# Patient Record
Sex: MALE | Race: WHITE | NOT HISPANIC OR LATINO | Employment: FULL TIME | URBAN - METROPOLITAN AREA
[De-identification: names, ages, dates, MRNs, and addresses within clinical notes are randomized per-mention and may not be internally consistent; named-entity substitution may affect disease eponyms.]

---

## 2017-02-09 PROBLEM — I10 ESSENTIAL HYPERTENSION: Status: ACTIVE | Noted: 2017-02-09

## 2017-11-03 ENCOUNTER — OFFICE VISIT (OUTPATIENT)
Dept: CARDIOLOGY | Facility: CLINIC | Age: 32
End: 2017-11-03
Payer: COMMERCIAL

## 2017-11-03 VITALS
SYSTOLIC BLOOD PRESSURE: 132 MMHG | DIASTOLIC BLOOD PRESSURE: 100 MMHG | WEIGHT: 219.5 LBS | BODY MASS INDEX: 28.17 KG/M2 | HEIGHT: 74 IN

## 2017-11-03 DIAGNOSIS — I10 ESSENTIAL HYPERTENSION: Primary | ICD-10-CM

## 2017-11-03 PROCEDURE — 99213 OFFICE O/P EST LOW 20 MIN: CPT | Mod: S$GLB,,, | Performed by: INTERNAL MEDICINE

## 2017-11-03 RX ORDER — LOSARTAN POTASSIUM 25 MG/1
25 TABLET ORAL DAILY
Qty: 90 TABLET | Refills: 3 | Status: SHIPPED | OUTPATIENT
Start: 2017-11-03 | End: 2018-03-28 | Stop reason: DRUGHIGH

## 2017-11-03 RX ORDER — METHADONE HYDROCHLORIDE 40 MG/1
40 TABLET ORAL
COMMUNITY
End: 2021-08-17

## 2017-11-03 NOTE — PROGRESS NOTES
Subjective:   Patient ID:  Everett Bateman is a 32 y.o. male     Chief Complaint   Patient presents with    Follow-up     Hypertension       HPI: Pt feels pretty good.  Pt skateboards for 15 minutes or longer every day.    Review of Systems   Cardiovascular: Negative for chest pain, claudication, dyspnea on exertion, irregular heartbeat, leg swelling, near-syncope, orthopnea, palpitations and syncope.     Still works at [employer].  Objective:   Physical Exam   Constitutional: He is oriented to person, place, and time. He appears well-developed and well-nourished. No distress.   HENT:   Head: Normocephalic.   Eyes: No scleral icterus.   Neck: No JVD present.   Cardiovascular: Normal rate, regular rhythm and normal heart sounds.  Exam reveals no gallop and no friction rub.    No murmur heard.  Pulmonary/Chest: Effort normal and breath sounds normal. No stridor.   Musculoskeletal: He exhibits no edema.   Neurological: He is alert and oriented to person, place, and time.   Skin: Skin is warm and dry. He is not diaphoretic.   Psychiatric: He has a normal mood and affect. His behavior is normal. Judgment and thought content normal.   Vitals reviewed.      Assessment:     1. Essential hypertension        Plan:   Add losartan 25 mg daily  Keep BP log  RTC one month

## 2018-03-28 ENCOUNTER — OFFICE VISIT (OUTPATIENT)
Dept: CARDIOLOGY | Facility: CLINIC | Age: 33
End: 2018-03-28
Payer: COMMERCIAL

## 2018-03-28 VITALS
DIASTOLIC BLOOD PRESSURE: 96 MMHG | SYSTOLIC BLOOD PRESSURE: 148 MMHG | BODY MASS INDEX: 30.54 KG/M2 | HEIGHT: 74 IN | HEART RATE: 71 BPM | WEIGHT: 238 LBS

## 2018-03-28 DIAGNOSIS — I10 ESSENTIAL HYPERTENSION: Primary | ICD-10-CM

## 2018-03-28 PROCEDURE — 3077F SYST BP >= 140 MM HG: CPT | Mod: CPTII,S$GLB,, | Performed by: INTERNAL MEDICINE

## 2018-03-28 PROCEDURE — 99213 OFFICE O/P EST LOW 20 MIN: CPT | Mod: S$GLB,,, | Performed by: INTERNAL MEDICINE

## 2018-03-28 PROCEDURE — 3080F DIAST BP >= 90 MM HG: CPT | Mod: CPTII,S$GLB,, | Performed by: INTERNAL MEDICINE

## 2018-03-28 RX ORDER — LOSARTAN POTASSIUM 50 MG/1
50 TABLET ORAL DAILY
Qty: 90 TABLET | Refills: 3 | Status: SHIPPED | OUTPATIENT
Start: 2018-03-28 | End: 2018-05-25 | Stop reason: SDUPTHER

## 2018-03-28 NOTE — PROGRESS NOTES
Subjective:      Patient ID: Everett Bateman is a 32 y.o. male.    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  Avoids sugar. Avoids salt.      Review of Systems   Cardiovascular: Negative for chest pain, claudication, dyspnea on exertion, irregular heartbeat, leg swelling, near-syncope, orthopnea, palpitations and syncope.      Often naps after work.  Snores only when sleeping on his back  Past Medical History:   Diagnosis Date    Heart murmur     Hypertension         History reviewed. No pertinent surgical history.    Family History   Problem Relation Age of Onset    Hypertension Mother     Hypertension Father     Hypertension Sister     Colon cancer Maternal Grandfather     Diabetes Mellitus Maternal Grandfather        Social History     Social History    Marital status: Single     Spouse name: N/A    Number of children: N/A    Years of education: N/A     Social History Main Topics    Smoking status: Current Every Day Smoker     Packs/day: 0.50     Years: 5.00     Types: Cigarettes    Smokeless tobacco: Never Used    Alcohol use 1.2 oz/week     2 Cans of beer per week    Drug use: Unknown    Sexual activity: Not Asked     Other Topics Concern    None     Social History Narrative    None       Current Outpatient Prescriptions on File Prior to Visit   Medication Sig Dispense Refill    hydrochlorothiazide (MICROZIDE) 12.5 mg capsule Take 1 capsule (12.5 mg total) by mouth once daily. 90 capsule 3    methadone (METHADOSE) 40 mg disintegrating tablet Take 40 mg by mouth. Take 2 tablet every morning.      [DISCONTINUED] losartan (COZAAR) 25 MG tablet Take 1 tablet (25 mg total) by mouth once daily. 90 tablet 3    desonide (DESOWEN) 0.05 % cream MASSAGE INTO FACE TWICE A DAY AS NEEDED FOR RASH  2    [id number] (AIRBORNE, ASCORBATE SODIUM,) 250-1.25 mg Lozg by Mucous Membrane route.       No current facility-administered medications on  "file prior to visit.        Review of patient's allergies indicates:  No Known Allergies  Objective:     Vitals:    03/28/18 1318 03/28/18 1319   BP: (!) 140/93 (!) 148/96   BP Location: Left arm Right arm   Patient Position: Sitting Sitting   BP Method: Large (Automatic) Large (Automatic)   Pulse: 71    Weight: 108 kg (238 lb)    Height: 6' 2" (1.88 m)         Physical Exam   Constitutional: He is oriented to person, place, and time. He appears well-developed and well-nourished. No distress.   Eyes: No scleral icterus.   Neck: No JVD present. Carotid bruit is not present.   Cardiovascular: Regular rhythm and normal heart sounds.  Exam reveals no gallop and no friction rub.    No murmur heard.  Pulmonary/Chest: Effort normal and breath sounds normal. No respiratory distress.   Musculoskeletal: He exhibits no edema.   Neurological: He is alert and oriented to person, place, and time.   Skin: Skin is warm and dry. He is not diaphoretic.   Psychiatric: He has a normal mood and affect. His behavior is normal. Judgment and thought content normal.   Vitals reviewed.     ECG-NSR, WNL    BP log at home  171/121-140/100  Assessment:     1. Essential hypertension      Plan:   Everett was seen today for follow-up.    Diagnoses and all orders for this visit:    Essential hypertension  -     CBC auto differential; Future  -     Comprehensive metabolic panel; Future  -     Lipid panel; Future  -     TSH; Future  -     Magnesium; Future    Other orders  -     losartan (COZAAR) 50 MG tablet; Take 1 tablet (50 mg total) by mouth once daily.     Smoking cessation counseled  Continue HCTZ  Increase the losartan to 50 mg   RTC 3 weeks  If BP is still elevated will go up to losartan 100 mg daily  Check lab  If BP remains elevated may need to add a calcium channel blocker    Follow-up in about 4 weeks (around 4/25/2018).  "

## 2018-05-30 RX ORDER — LOSARTAN POTASSIUM 50 MG/1
50 TABLET ORAL DAILY
Qty: 90 TABLET | Refills: 3 | Status: SHIPPED | OUTPATIENT
Start: 2018-05-30 | End: 2019-03-12 | Stop reason: SDUPTHER

## 2018-08-26 ENCOUNTER — OFFICE VISIT (OUTPATIENT)
Dept: URGENT CARE | Facility: CLINIC | Age: 33
End: 2018-08-26
Payer: COMMERCIAL

## 2018-08-26 VITALS
SYSTOLIC BLOOD PRESSURE: 159 MMHG | WEIGHT: 238 LBS | OXYGEN SATURATION: 98 % | HEART RATE: 94 BPM | RESPIRATION RATE: 18 BRPM | HEIGHT: 74 IN | DIASTOLIC BLOOD PRESSURE: 105 MMHG | BODY MASS INDEX: 30.54 KG/M2 | TEMPERATURE: 98 F

## 2018-08-26 DIAGNOSIS — J06.9 URI WITH COUGH AND CONGESTION: Primary | ICD-10-CM

## 2018-08-26 PROCEDURE — 3080F DIAST BP >= 90 MM HG: CPT | Mod: CPTII,S$GLB,, | Performed by: NURSE PRACTITIONER

## 2018-08-26 PROCEDURE — 99203 OFFICE O/P NEW LOW 30 MIN: CPT | Mod: S$GLB,,, | Performed by: NURSE PRACTITIONER

## 2018-08-26 PROCEDURE — 3008F BODY MASS INDEX DOCD: CPT | Mod: CPTII,S$GLB,, | Performed by: NURSE PRACTITIONER

## 2018-08-26 PROCEDURE — 3077F SYST BP >= 140 MM HG: CPT | Mod: CPTII,S$GLB,, | Performed by: NURSE PRACTITIONER

## 2018-08-26 RX ORDER — BENZONATATE 100 MG/1
200 CAPSULE ORAL 3 TIMES DAILY PRN
Qty: 30 CAPSULE | Refills: 0 | Status: SHIPPED | OUTPATIENT
Start: 2018-08-26 | End: 2019-03-20

## 2018-08-26 RX ORDER — PROMETHAZINE HYDROCHLORIDE AND DEXTROMETHORPHAN HYDROBROMIDE 6.25; 15 MG/5ML; MG/5ML
5 SYRUP ORAL EVERY 6 HOURS PRN
Qty: 180 ML | Refills: 0 | Status: SHIPPED | OUTPATIENT
Start: 2018-08-26 | End: 2018-09-05

## 2018-08-26 RX ORDER — IBUPROFEN 800 MG/1
800 TABLET ORAL 3 TIMES DAILY
COMMUNITY
End: 2021-10-25

## 2018-08-26 RX ORDER — DOXYCYCLINE 100 MG/1
100 CAPSULE ORAL 2 TIMES DAILY
Qty: 20 CAPSULE | Refills: 0 | Status: SHIPPED | OUTPATIENT
Start: 2018-08-26 | End: 2018-09-05

## 2018-08-26 NOTE — PROGRESS NOTES
"Subjective:       Patient ID: Everett Bateman is a 33 y.o. male.    Vitals:  height is 6' 2" (1.88 m) and weight is 108 kg (238 lb). His temperature is 98.3 °F (36.8 °C). His blood pressure is 159/105 (abnormal) and his pulse is 94. His respiration is 18 and oxygen saturation is 98%.     Chief Complaint: Cough    Pt c/o cough, congestion        Cough   This is a new problem. The current episode started 1 to 4 weeks ago. The problem has been gradually worsening. The problem occurs constantly. The cough is productive of sputum. Pertinent negatives include no chest pain, chills, fever, headaches, rash, sore throat or shortness of breath. Nothing aggravates the symptoms. Treatments tried: Mucinex. The treatment provided no relief.     Review of Systems   Constitution: Negative for chills and fever.   HENT: Positive for congestion. Negative for sore throat.    Eyes: Negative for blurred vision.   Cardiovascular: Negative for chest pain.   Respiratory: Positive for cough. Negative for shortness of breath.    Skin: Negative for rash.   Musculoskeletal: Negative for back pain and joint pain.   Gastrointestinal: Negative for abdominal pain, diarrhea, nausea and vomiting.   Neurological: Negative for headaches.   Psychiatric/Behavioral: The patient is not nervous/anxious.        Objective:      Physical Exam   Constitutional: He is oriented to person, place, and time. He appears well-developed and well-nourished. He is cooperative.  Non-toxic appearance. He does not appear ill. No distress.   HENT:   Head: Normocephalic and atraumatic.   Right Ear: Hearing and ear canal normal. A middle ear effusion is present.   Left Ear: Hearing and ear canal normal. A middle ear effusion is present.   Nose: No mucosal edema, rhinorrhea or nasal deformity. No epistaxis. Right sinus exhibits maxillary sinus tenderness. Right sinus exhibits no frontal sinus tenderness. Left sinus exhibits maxillary sinus tenderness. Left sinus exhibits no frontal " sinus tenderness.   Mouth/Throat: Uvula is midline and mucous membranes are normal. No trismus in the jaw. Normal dentition. No uvula swelling. Posterior oropharyngeal edema and posterior oropharyngeal erythema present.   Eyes: Conjunctivae and lids are normal. Right eye exhibits no discharge. Left eye exhibits no discharge. No scleral icterus.   Sclera clear bilat   Neck: Trachea normal, normal range of motion, full passive range of motion without pain and phonation normal. Neck supple.   Cardiovascular: Normal rate, regular rhythm, normal heart sounds, intact distal pulses and normal pulses.   Pulmonary/Chest: Effort normal and breath sounds normal. No respiratory distress.   Abdominal: Soft. Normal appearance and bowel sounds are normal. He exhibits no distension, no pulsatile midline mass and no mass. There is no tenderness.   Musculoskeletal: Normal range of motion. He exhibits no edema or deformity.   Neurological: He is alert and oriented to person, place, and time. He exhibits normal muscle tone. Coordination normal.   Skin: Skin is warm, dry and intact. He is not diaphoretic. No pallor.   Psychiatric: He has a normal mood and affect. His speech is normal and behavior is normal. Judgment and thought content normal. Cognition and memory are normal.   Nursing note and vitals reviewed.      Assessment:       1. URI with cough and congestion        Plan:       Patient Instructions     Sinusitis (Antibiotic Treatment)    The sinuses are air-filled spaces within the bones of the face. They connect to the inside of the nose. Sinusitis is an inflammation of the tissue lining the sinus cavity. Sinus inflammation can occur during a cold. It can also be due to allergies to pollens and other particles in the air. Sinusitis can cause symptoms of sinus congestion and fullness. A sinus infection causes fever, headache and facial pain. There is often green or yellow drainage from the nose or into the back of the throat  (post-nasal drip). You have been given antibiotics to treat this condition.  Home care:  · Take the full course of antibiotics as instructed. Do not stop taking them, even if you feel better.  · Drink plenty of water, hot tea, and other liquids. This may help thin mucus. It also may promote sinus drainage.  · Heat may help soothe painful areas of the face. Use a towel soaked in hot water. Or,  the shower and direct the hot spray onto your face. Using a vaporizer along with a menthol rub at night may also help.   · An expectorant containing guaifenesin may help thin the mucus and promote drainage from the sinuses.  · Over-the-counter decongestants may be used unless a similar medicine was prescribed. Nasal sprays work the fastest. Use one that contains phenylephrine or oxymetazoline. First blow the nose gently. Then use the spray. Do not use these medicines more often than directed on the label or symptoms may get worse. You may also use tablets containing pseudoephedrine. Avoid products that combine ingredients, because side effects may be increased. Read labels. You can also ask the pharmacist for help. (NOTE: Persons with high blood pressure should not use decongestants. They can raise blood pressure.)  · Over-the-counter antihistamines may help if allergies contributed to your sinusitis.    · Do not use nasal rinses or irrigation during an acute sinus infection, unless told to by your health care provider. Rinsing may spread the infection to other sinuses.  · Use acetaminophen or ibuprofen to control pain, unless another pain medicine was prescribed. (If you have chronic liver or kidney disease or ever had a stomach ulcer, talk with your doctor before using these medicines. Aspirin should never be used in anyone under 18 years of age who is ill with a fever. It may cause severe liver damage.)  · Don't smoke. This can worsen symptoms.  Follow-up care  Follow up with your healthcare provider or our staff if  you are not improving within the next week.  When to seek medical advice  Call your healthcare provider if any of these occur:  · Facial pain or headache becoming more severe  · Stiff neck  · Unusual drowsiness or confusion  · Swelling of the forehead or eyelids  · Vision problems, including blurred or double vision  · Fever of 100.4ºF (38ºC) or higher, or as directed by your healthcare provider  · Seizure  · Breathing problems  · Symptoms not resolving within 10 days  Date Last Reviewed: 4/13/2015 © 2000-2017 [employer]. [address] [place] [address]. All rights reserved. This information is not intended as a substitute for professional medical care. Always follow your healthcare professional's instructions.            URI with cough and congestion    Other orders  -     doxycycline (VIBRAMYCIN) 100 MG Cap; Take 1 capsule (100 mg total) by mouth 2 (two) times daily. for 10 days  Dispense: 20 capsule; Refill: 0  -     benzonatate (TESSALON PERLES) 100 MG capsule; Take 2 capsules (200 mg total) by mouth 3 (three) times daily as needed.  Dispense: 30 capsule; Refill: 0  -     promethazine-dextromethorphan (PROMETHAZINE-DM) 6.25-15 mg/5 mL Syrp; Take 5 mLs by mouth every 6 (six) hours as needed.  Dispense: 180 mL; Refill: 0

## 2018-08-26 NOTE — PATIENT INSTRUCTIONS
Sinusitis (Antibiotic Treatment)    The sinuses are air-filled spaces within the bones of the face. They connect to the inside of the nose. Sinusitis is an inflammation of the tissue lining the sinus cavity. Sinus inflammation can occur during a cold. It can also be due to allergies to pollens and other particles in the air. Sinusitis can cause symptoms of sinus congestion and fullness. A sinus infection causes fever, headache and facial pain. There is often green or yellow drainage from the nose or into the back of the throat (post-nasal drip). You have been given antibiotics to treat this condition.  Home care:  · Take the full course of antibiotics as instructed. Do not stop taking them, even if you feel better.  · Drink plenty of water, hot tea, and other liquids. This may help thin mucus. It also may promote sinus drainage.  · Heat may help soothe painful areas of the face. Use a towel soaked in hot water. Or,  the shower and direct the hot spray onto your face. Using a vaporizer along with a menthol rub at night may also help.   · An expectorant containing guaifenesin may help thin the mucus and promote drainage from the sinuses.  · Over-the-counter decongestants may be used unless a similar medicine was prescribed. Nasal sprays work the fastest. Use one that contains phenylephrine or oxymetazoline. First blow the nose gently. Then use the spray. Do not use these medicines more often than directed on the label or symptoms may get worse. You may also use tablets containing pseudoephedrine. Avoid products that combine ingredients, because side effects may be increased. Read labels. You can also ask the pharmacist for help. (NOTE: Persons with high blood pressure should not use decongestants. They can raise blood pressure.)  · Over-the-counter antihistamines may help if allergies contributed to your sinusitis.    · Do not use nasal rinses or irrigation during an acute sinus infection, unless told to by  your health care provider. Rinsing may spread the infection to other sinuses.  · Use acetaminophen or ibuprofen to control pain, unless another pain medicine was prescribed. (If you have chronic liver or kidney disease or ever had a stomach ulcer, talk with your doctor before using these medicines. Aspirin should never be used in anyone under 18 years of age who is ill with a fever. It may cause severe liver damage.)  · Don't smoke. This can worsen symptoms.  Follow-up care  Follow up with your healthcare provider or our staff if you are not improving within the next week.  When to seek medical advice  Call your healthcare provider if any of these occur:  · Facial pain or headache becoming more severe  · Stiff neck  · Unusual drowsiness or confusion  · Swelling of the forehead or eyelids  · Vision problems, including blurred or double vision  · Fever of 100.4ºF (38ºC) or higher, or as directed by your healthcare provider  · Seizure  · Breathing problems  · Symptoms not resolving within 10 days  Date Last Reviewed: 4/13/2015  © 8717-7129 [employer] [place]. [address] [place] [address], [place] [address]. All rights reserved. This information is not intended as a substitute for professional medical care. Always follow your healthcare professional's instructions.

## 2018-09-26 ENCOUNTER — OFFICE VISIT (OUTPATIENT)
Dept: URGENT CARE | Facility: CLINIC | Age: 33
End: 2018-09-26
Payer: COMMERCIAL

## 2018-09-26 VITALS
BODY MASS INDEX: 30.54 KG/M2 | RESPIRATION RATE: 18 BRPM | WEIGHT: 238 LBS | OXYGEN SATURATION: 98 % | SYSTOLIC BLOOD PRESSURE: 124 MMHG | HEART RATE: 106 BPM | HEIGHT: 74 IN | TEMPERATURE: 98 F | DIASTOLIC BLOOD PRESSURE: 88 MMHG

## 2018-09-26 DIAGNOSIS — J02.9 SORE THROAT: Primary | ICD-10-CM

## 2018-09-26 PROCEDURE — 3008F BODY MASS INDEX DOCD: CPT | Mod: CPTII,S$GLB,, | Performed by: NURSE PRACTITIONER

## 2018-09-26 PROCEDURE — 99214 OFFICE O/P EST MOD 30 MIN: CPT | Mod: S$GLB,,, | Performed by: NURSE PRACTITIONER

## 2018-09-26 PROCEDURE — 3079F DIAST BP 80-89 MM HG: CPT | Mod: CPTII,S$GLB,, | Performed by: NURSE PRACTITIONER

## 2018-09-26 PROCEDURE — 3074F SYST BP LT 130 MM HG: CPT | Mod: CPTII,S$GLB,, | Performed by: NURSE PRACTITIONER

## 2018-09-26 RX ORDER — METHYLPREDNISOLONE 4 MG/1
TABLET ORAL
Qty: 1 PACKAGE | Refills: 0 | Status: SHIPPED | OUTPATIENT
Start: 2018-09-26 | End: 2019-03-20

## 2018-09-26 NOTE — PROGRESS NOTES
"Subjective:       Patient ID: Everett Bateman is a 33 y.o. male.    Vitals:  height is 6' 2" (1.88 m) and weight is 108 kg (238 lb). His temperature is 98.3 °F (36.8 °C). His blood pressure is 124/88 and his pulse is 106. His respiration is 18 and oxygen saturation is 98%.     Chief Complaint: Sore Throat    Sore throat and cough for 4 days   Smoker         Sore Throat    The current episode started in the past 7 days. The problem has been gradually worsening. There has been no fever. Associated symptoms include coughing and trouble swallowing. Pertinent negatives include no abdominal pain, congestion, ear pain, headaches, hoarse voice or shortness of breath. He has had no exposure to strep or mono. He has tried oral narcotic analgesics for the symptoms. The treatment provided no relief.     Review of Systems   Constitution: Negative for chills, fever and malaise/fatigue.   HENT: Positive for sore throat and trouble swallowing. Negative for congestion, ear pain and hoarse voice.    Eyes: Negative for discharge and redness.   Cardiovascular: Negative for chest pain, dyspnea on exertion and leg swelling.   Respiratory: Positive for cough. Negative for shortness of breath, sputum production and wheezing.    Musculoskeletal: Negative for myalgias.   Gastrointestinal: Negative for abdominal pain and nausea.   Neurological: Negative for headaches.       Objective:      Physical Exam   Constitutional: He is oriented to person, place, and time. He appears well-developed and well-nourished. He is cooperative.  Non-toxic appearance. He does not appear ill. No distress.   HENT:   Head: Normocephalic and atraumatic.   Right Ear: Hearing, tympanic membrane, external ear and ear canal normal.   Left Ear: Hearing, tympanic membrane, external ear and ear canal normal.   Nose: Nose normal. No mucosal edema, rhinorrhea or nasal deformity. No epistaxis. Right sinus exhibits no maxillary sinus tenderness and no frontal sinus tenderness. " Left sinus exhibits no maxillary sinus tenderness and no frontal sinus tenderness.   Mouth/Throat: Uvula is midline and mucous membranes are normal. No trismus in the jaw. Normal dentition. No uvula swelling. Posterior oropharyngeal erythema present. Tonsils are 1+ on the right. Tonsils are 1+ on the left. Tonsillar exudate (right).   Eyes: Conjunctivae and lids are normal. Right eye exhibits no discharge. Left eye exhibits no discharge.   Sclera clear bilat   Neck: Trachea normal, normal range of motion, full passive range of motion without pain and phonation normal. Neck supple.   Cardiovascular: Normal rate, regular rhythm, normal heart sounds, intact distal pulses and normal pulses.   Pulmonary/Chest: Effort normal and breath sounds normal. No respiratory distress.   Abdominal: Soft. Normal appearance and bowel sounds are normal. He exhibits no distension, no pulsatile midline mass and no mass. There is no tenderness.   Musculoskeletal: Normal range of motion. He exhibits no edema or deformity.   Neurological: He is alert and oriented to person, place, and time. He exhibits normal muscle tone. Coordination normal.   Skin: Skin is warm, dry and intact. He is not diaphoretic. No pallor.   Psychiatric: He has a normal mood and affect. His speech is normal and behavior is normal. Judgment and thought content normal. Cognition and memory are normal.   Nursing note and vitals reviewed.      Assessment:       1. Sore throat        Plan:         Sore throat  -     Cancel: POCT rapid strep A    Other orders  -     (Magic mouthwash) 1:1:1 Benadryl 12.5mg/5ml liq, aluminum & magnesium hydroxide-simehticone (Maalox), lidocaine viscous 2%; 10 cc swish and spit every 4 hours as needed for mouth sores or sore throat  Dispense: 90 mL; Refill: 0  -     methylPREDNISolone (MEDROL DOSEPACK) 4 mg tablet; Take all pills on one row at one time. 24 hours later take the second row etc till all meds taken  Dispense: 1 Package; Refill:  0    refused strep swab   Offered refer to smoking cessation and he declines  Patient Instructions   Symptomatic treatment: (consider the following unless you are allergic or cannot take the following suggestions)  Alternate Tylenol (not to exceed 4000 mg per 24 hours) and Ibuprofen (400 mg every 3 hours) for pain and fever   * do not take tylenol if you have liver disease or issues with your liver   *do not take motrin if you have kidney disease or on blood thinners   For a sore throat try salt water gargles and honey/lemon water to soothe throat  Cepachol spray helps to numb the discomfort in throat  Elderberry to reduce duration of URI symptoms  Warm face compresses/hot showers as often as you can to open sinuses   Vicks vapor rub at night  Flonase or Nasacort (nasal steroid over the counter, 1-2 squirts to each nare)  Nasal saline spray reduces inflammation and dryness  Stay hydrated with increased water intake and simple foods like chicken noodle soup help hydrate and soothe the throat  Drink pedialyte, gatorade or propel.   Delsym helps with coughing at night  Zyrtec, Allegra, or Claritin during the day for allergy relief  Benadryl at night may help if allergy component- do not use with phenergan because both medications can make you drowsy)  You can try breathe right strips at night to help you breathe  A cool mist humidifier in bedroom may help with cough and relieve stuffy nose.   Zantac will help if there is reflux from the post nasal drip  REST as much as you can    Sinus rinses DO NOT USE TAP WATER, if you must, water must be a rolling boil for 1 minute, let it cool, then use.  May use distilled water, or over the counter nasal saline rinses.  Vics vapor rub in shower to help open nasal passages.  May use nasal gel to keep passages moisturized.  May use Nasal saline sprays during the day for added relief of congestion.   For those who go to the gym, please do not use the sauna or steam room now to clear  sinuses.    During pollen season, change shirt if you are outside for a while when you go in.  Also wash your face.  Do not touch your face with your hands.  Wash your hands often in general while ill, avoid face contact with hands.     Your symptoms will likely last 5-7 days, maybe longer depending on how it affects your body.  You are contagious 5-7, so minimize contact with others to reduce the spread to others and stay home from work or school as we discussed. Dehydration is preventable but is one of the main reasons why you will feel so badly.  Antibiotics are not needed unless a complication (such as Otitis Media, Bacterial sinus infection or pneumonia). Taking antibiotics for Flu/Cold is not supported by evidence based medicine and can expose you to unnecessary side effects of the medication, such as anaphylaxis.   If you experience any:  Chest pain, shortness of breath, wheezing or difficulty breathing  Severe headache, face, neck or ear pain  New rash  Fever over 101.5º F (38.6 C) for more than three days  Confusion, behavior change or seizure  Severe weakness or dizziness  Go to ER

## 2018-09-26 NOTE — LETTER
September 28, 2018      [facility] [address] [place] [address]  Phone: [phone]  Fax: [phone]       Patient: Everett Bateman   YOB: 1985  Date of Visit: 09/28/2018    To Whom It May Concern:    Maci Bateman  was at [facility] on 09/26/2018. He may return to work/school on 09/29/2018. With no restrictions. If you have any questions or concerns, or if I can be of further assistance, please do not hesitate to contact me.    Sincerely,              Juliane Alonso MA

## 2019-02-18 ENCOUNTER — OFFICE VISIT (OUTPATIENT)
Dept: URGENT CARE | Facility: CLINIC | Age: 34
End: 2019-02-18
Payer: COMMERCIAL

## 2019-02-18 VITALS
HEIGHT: 74 IN | WEIGHT: 238 LBS | OXYGEN SATURATION: 96 % | HEART RATE: 77 BPM | SYSTOLIC BLOOD PRESSURE: 144 MMHG | TEMPERATURE: 99 F | DIASTOLIC BLOOD PRESSURE: 95 MMHG | BODY MASS INDEX: 30.54 KG/M2 | RESPIRATION RATE: 18 BRPM

## 2019-02-18 DIAGNOSIS — J06.9 VIRAL URI WITH COUGH: Primary | ICD-10-CM

## 2019-02-18 DIAGNOSIS — R21 RASH: ICD-10-CM

## 2019-02-18 PROCEDURE — 99214 OFFICE O/P EST MOD 30 MIN: CPT | Mod: 25,S$GLB,, | Performed by: NURSE PRACTITIONER

## 2019-02-18 PROCEDURE — 96372 THER/PROPH/DIAG INJ SC/IM: CPT | Mod: S$GLB,,, | Performed by: FAMILY MEDICINE

## 2019-02-18 PROCEDURE — 3008F PR BODY MASS INDEX (BMI) DOCUMENTED: ICD-10-PCS | Mod: CPTII,S$GLB,, | Performed by: NURSE PRACTITIONER

## 2019-02-18 PROCEDURE — 3008F BODY MASS INDEX DOCD: CPT | Mod: CPTII,S$GLB,, | Performed by: NURSE PRACTITIONER

## 2019-02-18 PROCEDURE — 3080F DIAST BP >= 90 MM HG: CPT | Mod: CPTII,S$GLB,, | Performed by: NURSE PRACTITIONER

## 2019-02-18 PROCEDURE — 3077F SYST BP >= 140 MM HG: CPT | Mod: CPTII,S$GLB,, | Performed by: NURSE PRACTITIONER

## 2019-02-18 PROCEDURE — 3080F PR MOST RECENT DIASTOLIC BLOOD PRESSURE >= 90 MM HG: ICD-10-PCS | Mod: CPTII,S$GLB,, | Performed by: NURSE PRACTITIONER

## 2019-02-18 PROCEDURE — 99214 PR OFFICE/OUTPT VISIT, EST, LEVL IV, 30-39 MIN: ICD-10-PCS | Mod: 25,S$GLB,, | Performed by: NURSE PRACTITIONER

## 2019-02-18 PROCEDURE — 3077F PR MOST RECENT SYSTOLIC BLOOD PRESSURE >= 140 MM HG: ICD-10-PCS | Mod: CPTII,S$GLB,, | Performed by: NURSE PRACTITIONER

## 2019-02-18 PROCEDURE — 96372 PR INJECTION,THERAP/PROPH/DIAG2ST, IM OR SUBCUT: ICD-10-PCS | Mod: S$GLB,,, | Performed by: FAMILY MEDICINE

## 2019-02-18 RX ORDER — BETAMETHASONE SODIUM PHOSPHATE AND BETAMETHASONE ACETATE 3; 3 MG/ML; MG/ML
6 INJECTION, SUSPENSION INTRA-ARTICULAR; INTRALESIONAL; INTRAMUSCULAR; SOFT TISSUE
Status: COMPLETED | OUTPATIENT
Start: 2019-02-18 | End: 2019-02-18

## 2019-02-18 RX ORDER — DESONIDE 0.5 MG/G
CREAM TOPICAL
Qty: 60 G | Refills: 1 | Status: SHIPPED | OUTPATIENT
Start: 2019-02-18

## 2019-02-18 RX ADMIN — BETAMETHASONE SODIUM PHOSPHATE AND BETAMETHASONE ACETATE 6 MG: 3; 3 INJECTION, SUSPENSION INTRA-ARTICULAR; INTRALESIONAL; INTRAMUSCULAR; SOFT TISSUE at 09:02

## 2019-02-18 NOTE — PROGRESS NOTES
"Subjective:       Patient ID: Everett Bateman is a 33 y.o. male.    Vitals:  height is 6' 2" (1.88 m) and weight is 108 kg (238 lb). His temperature is 98.5 °F (36.9 °C). His blood pressure is 144/95 (abnormal) and his pulse is 77. His respiration is 18 and oxygen saturation is 96%.     Chief Complaint: Cough    Productive cough and rash on nose for 3 days. Patient needs a refill on the cream he uses for the rash on his nose       Cough   This is a new problem. The current episode started in the past 7 days. The problem has been unchanged. The problem occurs every few minutes. The cough is productive of sputum. Associated symptoms include a rash. Pertinent negatives include no chills, ear pain, eye redness, fever, hemoptysis, myalgias, sore throat, shortness of breath or wheezing. He has tried OTC cough suppressant for the symptoms. His past medical history is significant for bronchitis. There is no history of asthma or pneumonia.       Constitution: Negative for chills, sweating, fatigue and fever.   HENT: Negative for ear pain, congestion, sinus pain, sinus pressure, sore throat and voice change.    Neck: Negative for painful lymph nodes.   Eyes: Negative for eye redness.   Respiratory: Positive for cough and sputum production. Negative for chest tightness, bloody sputum, COPD, shortness of breath, stridor, wheezing and asthma.    Gastrointestinal: Negative for nausea and vomiting.   Musculoskeletal: Negative for muscle ache.   Skin: Positive for rash.   Allergic/Immunologic: Negative for seasonal allergies and asthma.   Hematologic/Lymphatic: Negative for swollen lymph nodes.       Objective:      Physical Exam   Constitutional: He is oriented to person, place, and time. Vital signs are normal. He appears well-developed and well-nourished. He is active and cooperative.  Non-toxic appearance. He does not have a sickly appearance. He does not appear ill. No distress.   HENT:   Head: Normocephalic and atraumatic. "   Right Ear: Hearing, tympanic membrane, external ear and ear canal normal.   Left Ear: Hearing, tympanic membrane, external ear and ear canal normal.   Nose: Mucosal edema and rhinorrhea present. Right sinus exhibits no maxillary sinus tenderness. Left sinus exhibits no maxillary sinus tenderness.   Mouth/Throat: Uvula is midline and mucous membranes are normal. Posterior oropharyngeal erythema (mild with cobblestones noted) present. No oropharyngeal exudate, posterior oropharyngeal edema or tonsillar abscesses. No tonsillar exudate.   Eyes: Conjunctivae and EOM are normal. Pupils are equal, round, and reactive to light.   Neck: Neck supple. No JVD present.   Cardiovascular: Normal rate, regular rhythm, normal heart sounds and intact distal pulses. Exam reveals no decreased pulses.   Pulses:       Carotid pulses are 2+ on the right side, and 2+ on the left side.       Radial pulses are 2+ on the right side, and 2+ on the left side.   Pulmonary/Chest: Effort normal and breath sounds normal.   Musculoskeletal: Normal range of motion.   Lymphadenopathy:        Head (right side): No submental, no submandibular, no tonsillar, no preauricular and no posterior auricular adenopathy present.        Head (left side): No submental, no submandibular, no tonsillar, no preauricular and no posterior auricular adenopathy present.     He has no cervical adenopathy.   Neurological: He is alert and oriented to person, place, and time. Gait normal.   Skin: Skin is warm. Capillary refill takes less than 2 seconds. Rash noted.        Erythema with scaling of skin noted to nose   Psychiatric: He has a normal mood and affect. His behavior is normal. Judgment and thought content normal.   Nursing note and vitals reviewed.      Assessment:       1. Viral URI with cough    2. Rash        Plan:         Viral URI with cough  -     betamethasone acetate-betamethasone sodium phosphate injection 6 mg    Rash  -     desonide (DESOWEN) 0.05 %  cream; Apply to affected area 2 times daily  Dispense: 60 g; Refill: 1      Patient Instructions   You have been diagnosed with a viral syndrome. Viral syndromes are self-limited and usually resolve within 10 days from onset of symptoms. Antibiotics are not effective against viral infections. It is important that you get lots of rest and drink plenty of fluids. Treatment is through symptomatic relief.    Below are suggestions for symptomatic relief:   -Tylenol every 4 hours OR ibuprofen every 6 hours as needed for pain/fever.   -Salt water gargles to soothe throat pain.   -Chloroseptic spray also helps to numb throat pain.   -Nasal saline spray reduces inflammation and dryness.   -Warm face compresses to help with facial sinus pain/pressure.   -Vicks vapor rub at night.   -Flonase OTC or Nasacort OTC for nasal congestion.   -Simple foods like chicken noodle soup.   -Delsym helps with coughing at night   -Zyrtec/Claritin during the day & Benadryl at night may help with allergies.     If you DO NOT have Hypertension or any history of palpitations, it is ok to take over the counter Sudafed or Mucinex D or Allegra-D or Claritin-D or Zyrtec-D.  If you do take one of the above, it is ok to combine that with plain over the counter Mucinex or Allegra or Claritin or Zyrtec. If, for example, you are taking Zyrtec -D, you can combine that with Mucinex, but not Mucinex-D.  If you are taking Mucinex-D, you can combine that with plain Allegra or Claritin or Zyrtec.   If you DO have Hypertension or palpitations, it is safe to take Coricidin HBP for relief of sinus symptoms.    Please return to clinic if symptoms have not subsided 10-14 days from onset, as your viral infection may have developed into a bacterial infection. It is at that time antibiotics would be indicated.       Please follow up with your primary care provider within 2-5 days if your signs and symptoms have not resolved or worsen.     If your condition worsens or  fails to improve we recommend that you receive another evaluation at the emergency room immediately or contact your primary medical clinic to discuss your concerns.   You must understand that you have received an Urgent Care treatment only and that you may be released before all of your medical problems are known or treated. You, the patient, will arrange for follow up care as instructed.           Atopic Dermatitis (Adult)  Atopic dermatitis is a dry, itchy, red rash. Its also called eczema. The rash is chronic, or ongoing. It can come and go over time. The disease is often passed down in families. It causes a problem with the skin barrier that makes the skin more sensitive to the environment and other factors. The increased skin sensitivity causes an itch, which causes scratching. Scratching can worsen the itching or also break the skin. This can put the skin at risk of infection.  The condition is most common in people with asthma, hay fever, hives, or dry or sensitive skin. The rash may be caused by extreme heat or heavy sweating. Skin irritants can cause the rash to flare up. These can include wool or silk clothing, grease, oils, some medicines, and harsh soaps and detergents. Emotional stress can also be a trigger.  Treatment is done to relieve the itching and inflammation of the skin.  Home care  Follow these tips to care for your condition:  · Keep the areas of rash clean by bathing at least every other day. Use lukewarm water to bathe. Dont use hot water, which can dry out the skin.  · Dont use soaps with strong detergents. Use mild soaps made for sensitive skin.  · Apply a cream or ointment to damp skin right after bathing.  · Avoid things that irritate your skin. Wear absorbent, soft fabrics next to the skin rather than rough or scratchy materials.  · Use mild laundry soap free of scents and perfumes. Make sure to rinse all the soap out of your clothes.  · Treat any skin infection as directed.  · Use  oral diphenhydramine to help reduce itching. This is an antihistamine you can buy at drug and grocery stores. It can make you sleepy, so use lower doses during the daytime. Or you can use loratadine. This is an antihistamine that will not make you sleepy. Do not use diphenhydramine if you have glaucoma or have trouble urinating due to an enlarged prostate.  Follow-up care  See your healthcare provider, or as advised. If your symptoms dont get better or if they get worse in the next 7 days, make an appointment with your healthcare provider.  When to seek medical advice  Call your healthcare provider right away  if any of these occur:  · Increasing area of redness or pain in the skin  · Yellow crusts or wet drainage from the rash  · Fever of 100.4°F (38°C) or higher, or as directed by your healthcare provider  Date Last Reviewed: 9/1/2016 © 2000-2017 [employer]. [address] [place] [address]. All rights reserved. This information is not intended as a substitute for professional medical care. Always follow your healthcare professional's instructions.        Viral Upper Respiratory Illness (Adult)  You have a viral upper respiratory illness (URI), which is another term for the common cold. This illness is contagious during the first few days. It is spread through the air by coughing and sneezing. It may also be spread by direct contact (touching the sick person and then touching your own eyes, nose, or mouth). Frequent handwashing will decrease risk of spread. Most viral illnesses go away within 7 to 10 days with rest and simple home remedies. Sometimes the illness may last for several weeks. Antibiotics will not kill a virus, and they are generally not prescribed for this condition.    Home care  · If symptoms are severe, rest at home for the first 2 to 3 days. When you resume activity, don't let yourself get too tired.  · Avoid being exposed to cigarette smoke (yours or others).  · You  may use acetaminophen or ibuprofen to control pain and fever, unless another medicine was prescribed. (Note: If you have chronic liver or kidney disease, have ever had a stomach ulcer or gastrointestinal bleeding, or are taking blood-thinning medicines, talk with your healthcare provider before using these medicines.) Aspirin should never be given to anyone under 18 years of age who is ill with a viral infection or fever. It may cause severe liver or brain damage.  · Your appetite may be poor, so a light diet is fine. Avoid dehydration by drinking 6 to 8 glasses of fluids per day (water, soft drinks, juices, tea, or soup). Extra fluids will help loosen secretions in the nose and lungs.  · Over-the-counter cold medicines will not shorten the length of time youre sick, but they may be helpful for the following symptoms: cough, sore throat, and nasal and sinus congestion. (Note: Do not use decongestants if you have high blood pressure.)  Follow-up care  Follow up with your healthcare provider, or as advised.  When to seek medical advice  Call your healthcare provider right away if any of these occur:  · Cough with lots of colored sputum (mucus)  · Severe headache; face, neck, or ear pain  · Difficulty swallowing due to throat pain  · Fever of 100.4°F (38°C)  Call 911, or get immediate medical care  Call emergency services right away if any of these occur:  · Chest pain, shortness of breath, wheezing, or difficulty breathing  · Coughing up blood  · Inability to swallow due to throat pain  Date Last Reviewed: 9/13/2015  © 8956-8233 [employer]. [address] [place] [address], [place] [address]. All rights reserved. This information is not intended as a substitute for professional medical care. Always follow your healthcare professional's instructions.

## 2019-02-18 NOTE — PATIENT INSTRUCTIONS
You have been diagnosed with a viral syndrome. Viral syndromes are self-limited and usually resolve within 10 days from onset of symptoms. Antibiotics are not effective against viral infections. It is important that you get lots of rest and drink plenty of fluids. Treatment is through symptomatic relief.    Below are suggestions for symptomatic relief:   -Tylenol every 4 hours OR ibuprofen every 6 hours as needed for pain/fever.   -Salt water gargles to soothe throat pain.   -Chloroseptic spray also helps to numb throat pain.   -Nasal saline spray reduces inflammation and dryness.   -Warm face compresses to help with facial sinus pain/pressure.   -Vicks vapor rub at night.   -Flonase OTC or Nasacort OTC for nasal congestion.   -Simple foods like chicken noodle soup.   -Delsym helps with coughing at night   -Zyrtec/Claritin during the day & Benadryl at night may help with allergies.     If you DO NOT have Hypertension or any history of palpitations, it is ok to take over the counter Sudafed or Mucinex D or Allegra-D or Claritin-D or Zyrtec-D.  If you do take one of the above, it is ok to combine that with plain over the counter Mucinex or Allegra or Claritin or Zyrtec. If, for example, you are taking Zyrtec -D, you can combine that with Mucinex, but not Mucinex-D.  If you are taking Mucinex-D, you can combine that with plain Allegra or Claritin or Zyrtec.   If you DO have Hypertension or palpitations, it is safe to take Coricidin HBP for relief of sinus symptoms.    Please return to clinic if symptoms have not subsided 10-14 days from onset, as your viral infection may have developed into a bacterial infection. It is at that time antibiotics would be indicated.       Please follow up with your primary care provider within 2-5 days if your signs and symptoms have not resolved or worsen.     If your condition worsens or fails to improve we recommend that you receive another evaluation at the emergency room immediately  or contact your primary medical clinic to discuss your concerns.   You must understand that you have received an Urgent Care treatment only and that you may be released before all of your medical problems are known or treated. You, the patient, will arrange for follow up care as instructed.           Atopic Dermatitis (Adult)  Atopic dermatitis is a dry, itchy, red rash. Its also called eczema. The rash is chronic, or ongoing. It can come and go over time. The disease is often passed down in families. It causes a problem with the skin barrier that makes the skin more sensitive to the environment and other factors. The increased skin sensitivity causes an itch, which causes scratching. Scratching can worsen the itching or also break the skin. This can put the skin at risk of infection.  The condition is most common in people with asthma, hay fever, hives, or dry or sensitive skin. The rash may be caused by extreme heat or heavy sweating. Skin irritants can cause the rash to flare up. These can include wool or silk clothing, grease, oils, some medicines, and harsh soaps and detergents. Emotional stress can also be a trigger.  Treatment is done to relieve the itching and inflammation of the skin.  Home care  Follow these tips to care for your condition:  · Keep the areas of rash clean by bathing at least every other day. Use lukewarm water to bathe. Dont use hot water, which can dry out the skin.  · Dont use soaps with strong detergents. Use mild soaps made for sensitive skin.  · Apply a cream or ointment to damp skin right after bathing.  · Avoid things that irritate your skin. Wear absorbent, soft fabrics next to the skin rather than rough or scratchy materials.  · Use mild laundry soap free of scents and perfumes. Make sure to rinse all the soap out of your clothes.  · Treat any skin infection as directed.  · Use oral diphenhydramine to help reduce itching. This is an antihistamine you can buy at drug and grocery  stores. It can make you sleepy, so use lower doses during the daytime. Or you can use loratadine. This is an antihistamine that will not make you sleepy. Do not use diphenhydramine if you have glaucoma or have trouble urinating due to an enlarged prostate.  Follow-up care  See your healthcare provider, or as advised. If your symptoms dont get better or if they get worse in the next 7 days, make an appointment with your healthcare provider.  When to seek medical advice  Call your healthcare provider right away  if any of these occur:  · Increasing area of redness or pain in the skin  · Yellow crusts or wet drainage from the rash  · Fever of 100.4°F (38°C) or higher, or as directed by your healthcare provider  Date Last Reviewed: 9/1/2016 © 2000-2017 [employer]. [address] [place] [address]. All rights reserved. This information is not intended as a substitute for professional medical care. Always follow your healthcare professional's instructions.        Viral Upper Respiratory Illness (Adult)  You have a viral upper respiratory illness (URI), which is another term for the common cold. This illness is contagious during the first few days. It is spread through the air by coughing and sneezing. It may also be spread by direct contact (touching the sick person and then touching your own eyes, nose, or mouth). Frequent handwashing will decrease risk of spread. Most viral illnesses go away within 7 to 10 days with rest and simple home remedies. Sometimes the illness may last for several weeks. Antibiotics will not kill a virus, and they are generally not prescribed for this condition.    Home care  · If symptoms are severe, rest at home for the first 2 to 3 days. When you resume activity, don't let yourself get too tired.  · Avoid being exposed to cigarette smoke (yours or others).  · You may use acetaminophen or ibuprofen to control pain and fever, unless another medicine was prescribed.  (Note: If you have chronic liver or kidney disease, have ever had a stomach ulcer or gastrointestinal bleeding, or are taking blood-thinning medicines, talk with your healthcare provider before using these medicines.) Aspirin should never be given to anyone under 18 years of age who is ill with a viral infection or fever. It may cause severe liver or brain damage.  · Your appetite may be poor, so a light diet is fine. Avoid dehydration by drinking 6 to 8 glasses of fluids per day (water, soft drinks, juices, tea, or soup). Extra fluids will help loosen secretions in the nose and lungs.  · Over-the-counter cold medicines will not shorten the length of time youre sick, but they may be helpful for the following symptoms: cough, sore throat, and nasal and sinus congestion. (Note: Do not use decongestants if you have high blood pressure.)  Follow-up care  Follow up with your healthcare provider, or as advised.  When to seek medical advice  Call your healthcare provider right away if any of these occur:  · Cough with lots of colored sputum (mucus)  · Severe headache; face, neck, or ear pain  · Difficulty swallowing due to throat pain  · Fever of 100.4°F (38°C)  Call 911, or get immediate medical care  Call emergency services right away if any of these occur:  · Chest pain, shortness of breath, wheezing, or difficulty breathing  · Coughing up blood  · Inability to swallow due to throat pain  Date Last Reviewed: 9/13/2015  © 1647-7857 [employer] [address] [place] [address], [place] [address]. All rights reserved. This information is not intended as a substitute for professional medical care. Always follow your healthcare professional's instructions.

## 2019-03-10 ENCOUNTER — NURSE TRIAGE (OUTPATIENT)
Dept: ADMINISTRATIVE | Facility: CLINIC | Age: 34
End: 2019-03-10

## 2019-03-10 NOTE — TELEPHONE ENCOUNTER
Reason for Disposition   Caller has medication question about med not prescribed by PCP and triager unable to answer question (e.g., compatibility with other med, storage)    Protocols used: MEDICATION QUESTION CALL-A-    Pt calling requesting a refill for medications (Hydrochlorothiazide and Losartan).  Pt states he has 1 pill of Losartan left but no HCTZ left.  Care advice given.  Will message MD and Staff.

## 2019-03-12 DIAGNOSIS — I10 ESSENTIAL HYPERTENSION: ICD-10-CM

## 2019-03-12 RX ORDER — HYDROCHLOROTHIAZIDE 12.5 MG/1
12.5 CAPSULE ORAL DAILY
Qty: 30 CAPSULE | Refills: 0 | Status: SHIPPED | OUTPATIENT
Start: 2019-03-12 | End: 2019-03-27 | Stop reason: SDUPTHER

## 2019-03-12 RX ORDER — LOSARTAN POTASSIUM 50 MG/1
50 TABLET ORAL DAILY
Qty: 30 TABLET | Refills: 0 | Status: SHIPPED | OUTPATIENT
Start: 2019-03-12 | End: 2019-04-10 | Stop reason: SDUPTHER

## 2019-03-20 ENCOUNTER — OFFICE VISIT (OUTPATIENT)
Dept: CARDIOLOGY | Facility: CLINIC | Age: 34
End: 2019-03-20
Payer: COMMERCIAL

## 2019-03-20 VITALS
DIASTOLIC BLOOD PRESSURE: 98 MMHG | SYSTOLIC BLOOD PRESSURE: 138 MMHG | BODY MASS INDEX: 29.97 KG/M2 | WEIGHT: 233.5 LBS | HEIGHT: 74 IN

## 2019-03-20 DIAGNOSIS — I10 ESSENTIAL HYPERTENSION: Primary | ICD-10-CM

## 2019-03-20 PROCEDURE — 3008F BODY MASS INDEX DOCD: CPT | Mod: CPTII,S$GLB,, | Performed by: INTERNAL MEDICINE

## 2019-03-20 PROCEDURE — 3080F DIAST BP >= 90 MM HG: CPT | Mod: CPTII,S$GLB,, | Performed by: INTERNAL MEDICINE

## 2019-03-20 PROCEDURE — 3080F PR MOST RECENT DIASTOLIC BLOOD PRESSURE >= 90 MM HG: ICD-10-PCS | Mod: CPTII,S$GLB,, | Performed by: INTERNAL MEDICINE

## 2019-03-20 PROCEDURE — 93000 ELECTROCARDIOGRAM COMPLETE: CPT | Mod: S$GLB,,, | Performed by: INTERNAL MEDICINE

## 2019-03-20 PROCEDURE — 99213 OFFICE O/P EST LOW 20 MIN: CPT | Mod: S$GLB,,, | Performed by: INTERNAL MEDICINE

## 2019-03-20 PROCEDURE — 3008F PR BODY MASS INDEX (BMI) DOCUMENTED: ICD-10-PCS | Mod: CPTII,S$GLB,, | Performed by: INTERNAL MEDICINE

## 2019-03-20 PROCEDURE — 3075F PR MOST RECENT SYSTOLIC BLOOD PRESS GE 130-139MM HG: ICD-10-PCS | Mod: CPTII,S$GLB,, | Performed by: INTERNAL MEDICINE

## 2019-03-20 PROCEDURE — 93000 EKG 12-LEAD: ICD-10-PCS | Mod: S$GLB,,, | Performed by: INTERNAL MEDICINE

## 2019-03-20 PROCEDURE — 99213 PR OFFICE/OUTPT VISIT, EST, LEVL III, 20-29 MIN: ICD-10-PCS | Mod: S$GLB,,, | Performed by: INTERNAL MEDICINE

## 2019-03-20 PROCEDURE — 3075F SYST BP GE 130 - 139MM HG: CPT | Mod: CPTII,S$GLB,, | Performed by: INTERNAL MEDICINE

## 2019-03-20 NOTE — PROGRESS NOTES
Subjective:      Patient ID: Everett Bateman is a 33 y.o. male.    Chief Complaint: Follow-up (Hypertension)    HPI: Works at [employer].    Review of Systems   Cardiovascular: Negative for chest pain, claudication, dyspnea on exertion, irregular heartbeat, leg swelling, near-syncope, orthopnea, palpitations and syncope.        Past Medical History:   Diagnosis Date    Heart murmur     Hypertension         Past Surgical History:   Procedure Laterality Date    HAND SURGERY         Family History   Problem Relation Age of Onset    Hypertension Mother     Hypertension Father     Hypertension Sister     Colon cancer Maternal Grandfather     Diabetes Mellitus Maternal Grandfather        Social History     Socioeconomic History    Marital status: Single     Spouse name: None    Number of children: None    Years of education: None    Highest education level: None   Social Needs    Financial resource strain: None    Food insecurity - worry: None    Food insecurity - inability: None    Transportation needs - medical: None    Transportation needs - non-medical: None   Occupational History    None   Tobacco Use    Smoking status: Current Every Day Smoker     Packs/day: 0.50     Years: 5.00     Pack years: 2.50     Types: Cigarettes    Smokeless tobacco: Never Used   Substance and Sexual Activity    Alcohol use: Yes     Alcohol/week: 1.2 oz     Types: 2 Cans of beer per week    Drug use: None    Sexual activity: None   Other Topics Concern    None   Social History Narrative    None       Current Outpatient Medications on File Prior to Visit   Medication Sig Dispense Refill    desonide (DESOWEN) 0.05 % cream Apply to affected area 2 times daily 60 g 1    hydroCHLOROthiazide (MICROZIDE) 12.5 mg capsule Take 1 capsule (12.5 mg total) by mouth once daily. 30 capsule 0    ibuprofen (ADVIL,MOTRIN) 800 MG tablet Take 800 mg by mouth 3 (three) times daily.      losartan (COZAAR) 50 MG tablet  "Take 1 tablet (50 mg total) by mouth once daily. 30 tablet 0    methadone (METHADOSE) 40 mg disintegrating tablet Take 40 mg by mouth. Take 2 tablet every morning.      mv-mn-vitC-[id number] (AIRBORNE, ASCORBATE SODIUM,) 250-1.25 mg Lozg by Mucous Membrane route.      [DISCONTINUED] (Magic mouthwash) 1:1:1 Benadryl 12.5mg/5ml liq, aluminum & magnesium hydroxide-simehticone (Maalox), lidocaine viscous 2% 10 cc swish and spit every 4 hours as needed for mouth sores or sore throat 90 mL 0    [DISCONTINUED] benzonatate (TESSALON PERLES) 100 MG capsule Take 2 capsules (200 mg total) by mouth 3 (three) times daily as needed. 30 capsule 0    [DISCONTINUED] methylPREDNISolone (MEDROL DOSEPACK) 4 mg tablet Take all pills on one row at one time. 24 hours later take the second row etc till all meds taken 1 Package 0     No current facility-administered medications on file prior to visit.        Review of patient's allergies indicates:  No Known Allergies  Objective:     Vitals:    03/20/19 1438 03/20/19 1458   BP: (!) 133/90 (!) 138/98   BP Location: Left arm Left arm   Patient Position: Sitting Sitting   BP Method: Large (Automatic)    Weight: 105.9 kg (233 lb 8 oz)    Height: 6' 2" (1.88 m)         Physical Exam   Constitutional: He is oriented to person, place, and time. He appears well-developed and well-nourished. No distress.   Eyes: No scleral icterus.   Neck: No JVD present. Carotid bruit is not present.   Cardiovascular: Regular rhythm and normal heart sounds. Exam reveals no gallop and no friction rub.   No murmur heard.  Pulmonary/Chest: Effort normal and breath sounds normal. No respiratory distress.   Musculoskeletal: He exhibits no edema.   Neurological: He is alert and oriented to person, place, and time.   Skin: Skin is warm and dry. He is not diaphoretic.   Psychiatric: He has a normal mood and affect. His behavior is normal. Judgment and thought content normal.   Vitals reviewed.     Wt up 29 " lbs over 3 years    ECG: NSR, WNL  Assessment:     1. Essential hypertension      Plan:   Everett was seen today for follow-up.    Diagnoses and all orders for this visit:    Essential hypertension  -     IN OFFICE EKG 12-LEAD (to [facility])  -     CBC auto differential; Future  -     Comprehensive metabolic panel; Future  -     Lipid panel; Future  -     TSH; Future     Increase the losartan to 100 mg daily    Continue HCTZ 12.5 mg daily    Check lab ordered last time    May need to double the HCTZ or add amlodipine if BP is not at targer    Smoking cessation counseled    Follow-up in about 4 weeks (around 4/17/2019).

## 2019-03-27 DIAGNOSIS — I10 ESSENTIAL HYPERTENSION: ICD-10-CM

## 2019-03-27 RX ORDER — HYDROCHLOROTHIAZIDE 12.5 MG/1
12.5 CAPSULE ORAL DAILY
Qty: 30 CAPSULE | Refills: 1 | Status: SHIPPED | OUTPATIENT
Start: 2019-03-27 | End: 2019-07-03 | Stop reason: DRUGHIGH

## 2019-03-27 RX ORDER — LOSARTAN POTASSIUM 50 MG/1
50 TABLET ORAL DAILY
Qty: 30 TABLET | Refills: 1 | OUTPATIENT
Start: 2019-03-27 | End: 2020-03-26

## 2019-04-10 RX ORDER — LOSARTAN POTASSIUM 50 MG/1
50 TABLET ORAL DAILY
Qty: 90 TABLET | Refills: 3 | Status: SHIPPED | OUTPATIENT
Start: 2019-04-10 | End: 2019-06-01

## 2019-06-01 ENCOUNTER — NURSE TRIAGE (OUTPATIENT)
Dept: ADMINISTRATIVE | Facility: CLINIC | Age: 34
End: 2019-06-01

## 2019-06-01 RX ORDER — LOSARTAN POTASSIUM 100 MG/1
100 TABLET ORAL DAILY
Qty: 30 TABLET | Refills: 0 | Status: SHIPPED | OUTPATIENT
Start: 2019-06-01 | End: 2019-06-28 | Stop reason: SDUPTHER

## 2019-06-01 NOTE — TELEPHONE ENCOUNTER
Reason for Disposition   Caller has urgent medication question about med that PCP prescribed and triager unable to answer question    Protocols used: MEDICATION QUESTION CALL-A-  Pp states he is completely out of losartan. per OV 3/20 Increase the losartan to 100 mg daily. Spoke with dr carrie coyne for 30 day supply. LM on pharm VM at 214pm. Pt notified. Call back with questions

## 2019-06-01 NOTE — TELEPHONE ENCOUNTER
Reason for Disposition   Caller requesting a refill, no triage required, and triager able to refill per unit policy    Protocols used: MEDICATION QUESTION CALL-A-DEBBIE    Everett states new prescription of losartan was supposed to be sent in and he says the insurance isn't covering it because it's too early to be filled. Spoke to pharmacist and she advised the way she had it entered into her system it wasn't covered but with it being 100 mg/daily which is different than how it was written before it should be covered. Advised patient.

## 2019-06-03 ENCOUNTER — TELEPHONE (OUTPATIENT)
Dept: CARDIOLOGY | Facility: CLINIC | Age: 34
End: 2019-06-03

## 2019-06-03 NOTE — TELEPHONE ENCOUNTER
Called patient regarding his losartan RX no answer left message on his voicemail to return my phone call.

## 2019-06-28 RX ORDER — LOSARTAN POTASSIUM 100 MG/1
100 TABLET ORAL DAILY
Qty: 90 TABLET | Refills: 3 | Status: SHIPPED | OUTPATIENT
Start: 2019-06-28 | End: 2019-07-28

## 2019-07-03 ENCOUNTER — LAB VISIT (OUTPATIENT)
Dept: LAB | Facility: OTHER | Age: 34
End: 2019-07-03
Attending: INTERNAL MEDICINE
Payer: COMMERCIAL

## 2019-07-03 ENCOUNTER — OFFICE VISIT (OUTPATIENT)
Dept: CARDIOLOGY | Facility: CLINIC | Age: 34
End: 2019-07-03
Payer: COMMERCIAL

## 2019-07-03 VITALS
HEIGHT: 74 IN | WEIGHT: 225 LBS | SYSTOLIC BLOOD PRESSURE: 146 MMHG | DIASTOLIC BLOOD PRESSURE: 101 MMHG | BODY MASS INDEX: 28.88 KG/M2 | HEART RATE: 64 BPM

## 2019-07-03 DIAGNOSIS — Z72.0 TOBACCO USE: ICD-10-CM

## 2019-07-03 DIAGNOSIS — I10 ESSENTIAL HYPERTENSION: ICD-10-CM

## 2019-07-03 DIAGNOSIS — I10 ESSENTIAL HYPERTENSION: Primary | ICD-10-CM

## 2019-07-03 LAB
ALBUMIN SERPL BCP-MCNC: 4.5 G/DL (ref 3.5–5.2)
ALP SERPL-CCNC: 68 U/L (ref 55–135)
ALT SERPL W/O P-5'-P-CCNC: 19 U/L (ref 10–44)
ANION GAP SERPL CALC-SCNC: 10 MMOL/L (ref 8–16)
AST SERPL-CCNC: 24 U/L (ref 10–40)
BASOPHILS # BLD AUTO: 0.05 K/UL (ref 0–0.2)
BASOPHILS NFR BLD: 1.2 % (ref 0–1.9)
BILIRUB SERPL-MCNC: 1.2 MG/DL (ref 0.1–1)
BUN SERPL-MCNC: 13 MG/DL (ref 6–20)
CALCIUM SERPL-MCNC: 9.5 MG/DL (ref 8.7–10.5)
CHLORIDE SERPL-SCNC: 102 MMOL/L (ref 95–110)
CHOLEST SERPL-MCNC: 208 MG/DL (ref 120–199)
CHOLEST/HDLC SERPL: 5.8 {RATIO} (ref 2–5)
CO2 SERPL-SCNC: 27 MMOL/L (ref 23–29)
CREAT SERPL-MCNC: 0.9 MG/DL (ref 0.5–1.4)
DIFFERENTIAL METHOD: NORMAL
EOSINOPHIL # BLD AUTO: 0.3 K/UL (ref 0–0.5)
EOSINOPHIL NFR BLD: 6.1 % (ref 0–8)
ERYTHROCYTE [DISTWIDTH] IN BLOOD BY AUTOMATED COUNT: 12.8 % (ref 11.5–14.5)
EST. GFR  (AFRICAN AMERICAN): >60 ML/MIN/1.73 M^2
EST. GFR  (NON AFRICAN AMERICAN): >60 ML/MIN/1.73 M^2
GLUCOSE SERPL-MCNC: 96 MG/DL (ref 70–110)
HCT VFR BLD AUTO: 43.7 % (ref 40–54)
HDLC SERPL-MCNC: 36 MG/DL (ref 40–75)
HDLC SERPL: 17.3 % (ref 20–50)
HGB BLD-MCNC: 15.1 G/DL (ref 14–18)
LDLC SERPL CALC-MCNC: 150.4 MG/DL (ref 63–159)
LYMPHOCYTES # BLD AUTO: 1.3 K/UL (ref 1–4.8)
LYMPHOCYTES NFR BLD: 30.1 % (ref 18–48)
MCH RBC QN AUTO: 29.3 PG (ref 27–31)
MCHC RBC AUTO-ENTMCNC: 34.6 G/DL (ref 32–36)
MCV RBC AUTO: 85 FL (ref 82–98)
MONOCYTES # BLD AUTO: 0.4 K/UL (ref 0.3–1)
MONOCYTES NFR BLD: 10 % (ref 4–15)
NEUTROPHILS # BLD AUTO: 2.3 K/UL (ref 1.8–7.7)
NEUTROPHILS NFR BLD: 52.6 % (ref 38–73)
NONHDLC SERPL-MCNC: 172 MG/DL
PLATELET # BLD AUTO: 235 K/UL (ref 150–350)
PMV BLD AUTO: 11.5 FL (ref 9.2–12.9)
POTASSIUM SERPL-SCNC: 3.6 MMOL/L (ref 3.5–5.1)
PROT SERPL-MCNC: 8 G/DL (ref 6–8.4)
RBC # BLD AUTO: 5.15 M/UL (ref 4.6–6.2)
SODIUM SERPL-SCNC: 139 MMOL/L (ref 136–145)
TRIGL SERPL-MCNC: 108 MG/DL (ref 30–150)
TSH SERPL DL<=0.005 MIU/L-ACNC: 1.76 UIU/ML (ref 0.4–4)
WBC # BLD AUTO: 4.28 K/UL (ref 3.9–12.7)

## 2019-07-03 PROCEDURE — 99214 OFFICE O/P EST MOD 30 MIN: CPT | Mod: S$GLB,,, | Performed by: INTERNAL MEDICINE

## 2019-07-03 PROCEDURE — 80061 LIPID PANEL: CPT

## 2019-07-03 PROCEDURE — 3080F PR MOST RECENT DIASTOLIC BLOOD PRESSURE >= 90 MM HG: ICD-10-PCS | Mod: CPTII,S$GLB,, | Performed by: INTERNAL MEDICINE

## 2019-07-03 PROCEDURE — 85025 COMPLETE CBC W/AUTO DIFF WBC: CPT

## 2019-07-03 PROCEDURE — 3080F DIAST BP >= 90 MM HG: CPT | Mod: CPTII,S$GLB,, | Performed by: INTERNAL MEDICINE

## 2019-07-03 PROCEDURE — 36415 COLL VENOUS BLD VENIPUNCTURE: CPT

## 2019-07-03 PROCEDURE — 84443 ASSAY THYROID STIM HORMONE: CPT

## 2019-07-03 PROCEDURE — 3008F BODY MASS INDEX DOCD: CPT | Mod: CPTII,S$GLB,, | Performed by: INTERNAL MEDICINE

## 2019-07-03 PROCEDURE — 99214 PR OFFICE/OUTPT VISIT, EST, LEVL IV, 30-39 MIN: ICD-10-PCS | Mod: S$GLB,,, | Performed by: INTERNAL MEDICINE

## 2019-07-03 PROCEDURE — 3077F PR MOST RECENT SYSTOLIC BLOOD PRESSURE >= 140 MM HG: ICD-10-PCS | Mod: CPTII,S$GLB,, | Performed by: INTERNAL MEDICINE

## 2019-07-03 PROCEDURE — 93000 ELECTROCARDIOGRAM COMPLETE: CPT | Mod: S$GLB,,, | Performed by: INTERNAL MEDICINE

## 2019-07-03 PROCEDURE — 3008F PR BODY MASS INDEX (BMI) DOCUMENTED: ICD-10-PCS | Mod: CPTII,S$GLB,, | Performed by: INTERNAL MEDICINE

## 2019-07-03 PROCEDURE — 93000 EKG 12-LEAD: ICD-10-PCS | Mod: S$GLB,,, | Performed by: INTERNAL MEDICINE

## 2019-07-03 PROCEDURE — 3077F SYST BP >= 140 MM HG: CPT | Mod: CPTII,S$GLB,, | Performed by: INTERNAL MEDICINE

## 2019-07-03 PROCEDURE — 80053 COMPREHEN METABOLIC PANEL: CPT

## 2019-07-03 RX ORDER — VARENICLINE TARTRATE 0.5 (11)-1
KIT ORAL
Qty: 1 PACKAGE | Refills: 0 | Status: SHIPPED | OUTPATIENT
Start: 2019-07-03 | End: 2021-08-17

## 2019-07-03 RX ORDER — HYDROCHLOROTHIAZIDE 25 MG/1
25 TABLET ORAL DAILY
Qty: 90 TABLET | Refills: 3 | Status: SHIPPED | OUTPATIENT
Start: 2019-07-03 | End: 2020-04-13 | Stop reason: SDUPTHER

## 2019-07-03 NOTE — PROGRESS NOTES
Subjective:      Patient ID: Everett Bateman is a 33 y.o. male.    Chief Complaint: Follow-up (Hypertension)    HPI:  Takes ibuprofen for shoulder and knee pain when skateboarding.    Review of Systems   Cardiovascular: Negative for chest pain, claudication, dyspnea on exertion, irregular heartbeat, leg swelling, near-syncope, orthopnea, palpitations and syncope.      Works at the [employer]    Still smoking cigarettes      Past Medical History:   Diagnosis Date    Heart murmur     Hypertension         Past Surgical History:   Procedure Laterality Date    HAND SURGERY         Family History   Problem Relation Age of Onset    Hypertension Mother     Hypertension Father     Hypertension Sister     Colon cancer Maternal Grandfather     Diabetes Mellitus Maternal Grandfather        Social History     Socioeconomic History    Marital status: Single     Spouse name: Not on file    Number of children: Not on file    Years of education: Not on file    Highest education level: Not on file   Occupational History    Not on file   Social Needs    Financial resource strain: Not on file    Food insecurity:     Worry: Not on file     Inability: Not on file    Transportation needs:     Medical: Not on file     Non-medical: Not on file   Tobacco Use    Smoking status: Current Every Day Smoker     Packs/day: 0.50     Years: 5.00     Pack years: 2.50     Types: Cigarettes    Smokeless tobacco: Never Used   Substance and Sexual Activity    Alcohol use: Yes     Alcohol/week: 1.2 oz     Types: 2 Cans of beer per week    Drug use: Not on file    Sexual activity: Not on file   Lifestyle    Physical activity:     Days per week: Not on file     Minutes per session: Not on file    Stress: Not on file   Relationships    Social connections:     Talks on phone: Not on file     Gets together: Not on file     Attends [employer] service: Not on file     Active member of club or organization: Not on file     Attends meetings of  "clubs or organizations: Not on file     Relationship status: Not on file   Other Topics Concern    Not on file   Social History Narrative    Not on file       Current Outpatient Medications on File Prior to Visit   Medication Sig Dispense Refill    desonide (DESOWEN) 0.05 % cream Apply to affected area 2 times daily 60 g 1    ibuprofen (ADVIL,MOTRIN) 800 MG tablet Take 800 mg by mouth 3 (three) times daily.      losartan (COZAAR) 100 MG tablet Take 1 tablet (100 mg total) by mouth once daily. 90 tablet 3    methadone (METHADOSE) 40 mg disintegrating tablet Take 40 mg by mouth. Take 2 tablet every morning.      mv-mn-vitC-hqjUc-Xax-Flo-hc124 (AIRBORNE, ASCORBATE SODIUM,) 250-1.25 mg Lozg by Mucous Membrane route.      [DISCONTINUED] hydroCHLOROthiazide (MICROZIDE) 12.5 mg capsule Take 1 capsule (12.5 mg total) by mouth once daily. 30 capsule 1     No current facility-administered medications on file prior to visit.        Review of patient's allergies indicates:  No Known Allergies  Objective:     Vitals:    07/03/19 1116   BP: (!) 146/101   BP Location: Left arm   Patient Position: Sitting   BP Method: Large (Automatic)   Pulse: 64   Weight: 102.1 kg (225 lb)   Height: 6' 2" (1.88 m)        Physical Exam   Constitutional: He is oriented to person, place, and time. He appears well-developed and well-nourished. No distress.   Eyes: No scleral icterus.   Neck: No JVD present. Carotid bruit is not present.   Cardiovascular: Regular rhythm and normal heart sounds. Exam reveals no gallop and no friction rub.   No murmur heard.  Pulmonary/Chest: Effort normal and breath sounds normal. No respiratory distress.   Musculoskeletal: He exhibits no edema.   Neurological: He is alert and oriented to person, place, and time.   Skin: Skin is warm and dry. He is not diaphoretic.   Psychiatric: He has a normal mood and affect. His behavior is normal. Judgment and thought content normal.   Vitals reviewed.     Wt down 13 " lbs    ECG--NSR, WNL  Assessment:     1. Essential hypertension    2. Tobacco use      Plan:   Everett was seen today for follow-up.    Diagnoses and all orders for this visit:    Essential hypertension  -     IN OFFICE EKG 12-LEAD (to [facility])    Tobacco use  -     varenicline (CHANTIX STARTING MONTH BOX) 0.5 mg (11)- 1 mg (42) tablet; Take one 0.5mg tab by mouth once daily X3 days,then increase to one 0.5mg tab twice daily X4 days,then increase to one 1mg tab twice daily    Other orders  -     hydroCHLOROthiazide (HYDRODIURIL) 25 MG tablet; Take 1 tablet (25 mg total) by mouth once daily.    Increase the HCTZ to 25 mg daily  Substitute Tylenol in place of ibuprofen   Smoking cessation counseled    Chantix--discussed with pt  Check labs  RTC one month    Follow up in about 1 month (around 7/31/2019).

## 2019-07-05 ENCOUNTER — TELEPHONE (OUTPATIENT)
Dept: CARDIOLOGY | Facility: CLINIC | Age: 34
End: 2019-07-05

## 2019-07-05 NOTE — TELEPHONE ENCOUNTER
Reviewed labs with pt:  CBC and CMP and TSH OK  HDL 36    Discussed option of long-term statin with pt.  Recommend plant based diet and exercise and smoking cessation at this point.

## 2019-07-15 ENCOUNTER — TELEPHONE (OUTPATIENT)
Dept: CARDIOLOGY | Facility: CLINIC | Age: 34
End: 2019-07-15

## 2019-07-15 NOTE — TELEPHONE ENCOUNTER
I left message on voicemail:  Chantix is not covered by insurance.  Alternative is generic Bupropion 150 mg daily for 3 days and then bid.  Target quit smoking date is one week after starting.  Side effects include risk of suicidal ideation.  Alternatives include OTC nicotine gum, lozenge or patch.

## 2019-07-21 ENCOUNTER — OFFICE VISIT (OUTPATIENT)
Dept: URGENT CARE | Facility: CLINIC | Age: 34
End: 2019-07-21
Payer: COMMERCIAL

## 2019-07-21 VITALS
HEIGHT: 74 IN | RESPIRATION RATE: 20 BRPM | HEART RATE: 63 BPM | OXYGEN SATURATION: 98 % | SYSTOLIC BLOOD PRESSURE: 126 MMHG | WEIGHT: 225 LBS | TEMPERATURE: 98 F | DIASTOLIC BLOOD PRESSURE: 87 MMHG | BODY MASS INDEX: 28.88 KG/M2

## 2019-07-21 DIAGNOSIS — K59.00 CONSTIPATION, UNSPECIFIED CONSTIPATION TYPE: ICD-10-CM

## 2019-07-21 DIAGNOSIS — Z72.0 TOBACCO USE: Primary | ICD-10-CM

## 2019-07-21 PROCEDURE — 3074F SYST BP LT 130 MM HG: CPT | Mod: CPTII,S$GLB,, | Performed by: SURGERY

## 2019-07-21 PROCEDURE — 99214 PR OFFICE/OUTPT VISIT, EST, LEVL IV, 30-39 MIN: ICD-10-PCS | Mod: S$GLB,,, | Performed by: SURGERY

## 2019-07-21 PROCEDURE — 3074F PR MOST RECENT SYSTOLIC BLOOD PRESSURE < 130 MM HG: ICD-10-PCS | Mod: CPTII,S$GLB,, | Performed by: SURGERY

## 2019-07-21 PROCEDURE — 3079F DIAST BP 80-89 MM HG: CPT | Mod: CPTII,S$GLB,, | Performed by: SURGERY

## 2019-07-21 PROCEDURE — 3079F PR MOST RECENT DIASTOLIC BLOOD PRESSURE 80-89 MM HG: ICD-10-PCS | Mod: CPTII,S$GLB,, | Performed by: SURGERY

## 2019-07-21 PROCEDURE — 99214 OFFICE O/P EST MOD 30 MIN: CPT | Mod: S$GLB,,, | Performed by: SURGERY

## 2019-07-21 RX ORDER — POLYETHYLENE GLYCOL 3350 17 G/17G
17 POWDER, FOR SOLUTION ORAL DAILY
Qty: 119 G | Refills: 0 | Status: SHIPPED | OUTPATIENT
Start: 2019-07-21 | End: 2021-10-25

## 2019-07-21 NOTE — PATIENT INSTRUCTIONS
Constipation (Adult)  Constipation means that you have bowel movements that are less frequent than usual. Stools often become very hard and difficult to pass.  Constipation is very common. At some point in life it affects almost everyone. Since everyone's bowel habits are different, what is constipation to one person may not be to another. Your healthcare provider may do tests to diagnose constipation. It depends on what he or she finds when evaluating you.    Symptoms of constipation include:  · Abdominal pain  · Bloating  · Vomiting  · Painful bowel movements  · Itching, swelling, bleeding, or pain around the anus  Causes  Constipation can have many causes. These include:  · Diet low in fiber  · Too much dairy  · Not drinking enough liquids  · Lack of exercise or physical activity. This is especially true for older adults.  · Changes in lifestyle or daily routine, including pregnancy, aging, work, and travel  · Frequent use or misuse of laxatives  · Ignoring the urge to have a bowel movement or delaying it until later  · Medicines, such as certain prescription pain medicines, iron supplements, antacids, certain antidepressants, and calcium supplements  · Diseases like irritable bowel syndrome, bowel obstructions, stroke, diabetes, thyroid disease, Parkinson disease, hemorrhoids, and colon cancer  Complications  Potential complications of constipation can include:  · Hemorrhoids  · Rectal bleeding from hemorrhoids or anal fissures (skin tears)  · Hernias  · Dependency on laxatives  · Chronic constipation  · Fecal impaction  · Bowel obstruction or perforation  Home care  All treatment should be done after talking with your healthcare provider. This is especially true if you have another medical problems, are taking prescription medicines, or are an older adult. Treatment most often involves lifestyle changes. You may also need medicines. Your healthcare provider will tell you which will work best for you. Follow  the advice below to help avoid this problem in the future.  Lifestyle changes  These lifestyle changes can help prevent constipation:  · Diet. Eat a high-fiber diet, with fresh fruit and vegetables, and reduce dairy intake, meats, and processed foods  · Fluids. It's important to get enough fluids each day. Drink plenty of water when you eat more fiber. If you are on diet that limits the amount of fluid you can have, talk about this with your healthcare provider.  · Regular exercise. Check with your healthcare provider first.  Medications  Take any medicines as directed. Some laxatives are safe to use only every now and then. Others can be taken on a regular basis. Talk with your doctor or pharmacist if you have questions.  Prescription pain medicines can cause constipation. If you are taking this kind of medicine, ask your healthcare provider if you should also take a stool softener.  Medicines you may take to treat constipation include:  · Fiber supplements  · Stool softeners  · Laxatives  · Enemas  · Rectal suppositories  Follow-up care  Follow up with your healthcare provider if symptoms don't get better in the next few days. You may need to have more tests or see a specialist.  Call 911  Call 911 if any of these occur:  · Trouble breathing  · Stiff, rigid abdomen that is severely painful to touch  · Confusion  · Fainting or loss of consciousness  · Rapid heart rate  · Chest pain  When to seek medical advice  Call your healthcare provider right away if any of these occur:  · Fever over 100.4°F (38°C)  · Failure to resume normal bowel movements  · Pain in your abdomen or back gets worse  · Nausea or vomiting  · Swelling in your abdomen  · Blood in the stool  · Black, tarry stool  · Involuntary weight loss  · Weakness  Date Last Reviewed: 12/30/2015  © 0474-1397 [employer]. [address] [place] [address], [place] [address]. All rights reserved. This information is not intended as a substitute for  professional medical care. Always follow your healthcare professional's instructions.

## 2019-07-21 NOTE — PROGRESS NOTES
"Subjective:       Patient ID: Everett Bateman is a 34 y.o. male.    Vitals:  height is 6' 2" (1.88 m) and weight is 102.1 kg (225 lb). His temperature is 98.1 °F (36.7 °C). His blood pressure is 126/87 and his pulse is 63. His respiration is 20 and oxygen saturation is 98%.     Chief Complaint: Fall    4 days ago pt states he fell on his behind , he has a history of Hemorid and thinks the fall inflamed it. Pt states now he finds him self needing to have a bowel movement but isn't able to go. He has been taking salt baths everyday for the Hemorid . He has also been having abdominal pain that can be due to the Hemorid. He had a bowel movement this morning but before it was about 36 hours. He isnt having any pain in his behind.    Fall   The accident occurred 3 to 5 days ago. The fall occurred while standing (skate boarding). The point of impact was the buttocks. Pertinent negatives include no fever, headaches, nausea or vomiting.       Constitution: Negative for chills, fatigue and fever.   HENT: Negative for congestion and sore throat.    Neck: Negative for painful lymph nodes.   Cardiovascular: Negative for chest pain and leg swelling.   Eyes: Negative for double vision and blurred vision.   Respiratory: Negative for cough and shortness of breath.    Gastrointestinal: Negative for nausea, vomiting and diarrhea.   Genitourinary: Negative for dysuria, frequency and urgency.   Musculoskeletal: Negative for joint pain, joint swelling, muscle cramps and muscle ache.   Skin: Negative for color change, pale and rash.   Allergic/Immunologic: Negative for seasonal allergies.   Neurological: Negative for dizziness, history of vertigo, light-headedness, passing out and headaches.   Hematologic/Lymphatic: Negative for swollen lymph nodes, easy bruising/bleeding and history of blood clots. Does not bruise/bleed easily.   Psychiatric/Behavioral: Negative for nervous/anxious, sleep disturbance and depression. The patient is not " nervous/anxious.        Objective:      Physical Exam   Constitutional: He is oriented to person, place, and time. He appears well-developed and well-nourished. He is cooperative.  Non-toxic appearance. He does not appear ill. No distress.   HENT:   Head: Normocephalic and atraumatic.   Right Ear: Hearing, tympanic membrane, external ear and ear canal normal.   Left Ear: Hearing, tympanic membrane, external ear and ear canal normal.   Nose: Nose normal. No mucosal edema, rhinorrhea or nasal deformity. No epistaxis. Right sinus exhibits no maxillary sinus tenderness and no frontal sinus tenderness. Left sinus exhibits no maxillary sinus tenderness and no frontal sinus tenderness.   Mouth/Throat: Uvula is midline, oropharynx is clear and moist and mucous membranes are normal. No trismus in the jaw. Normal dentition. No uvula swelling. No posterior oropharyngeal erythema.   Eyes: Conjunctivae and lids are normal. Right eye exhibits no discharge. Left eye exhibits no discharge. No scleral icterus.   Sclera clear bilat   Neck: Trachea normal, normal range of motion, full passive range of motion without pain and phonation normal. Neck supple.   Cardiovascular: Normal rate, regular rhythm, normal heart sounds, intact distal pulses and normal pulses.   Pulmonary/Chest: Effort normal and breath sounds normal. No respiratory distress.   Abdominal: Soft. Normal appearance and bowel sounds are normal. He exhibits no distension, no pulsatile midline mass and no mass. There is no tenderness.   Musculoskeletal: Normal range of motion. He exhibits no edema or deformity.   Neurological: He is alert and oriented to person, place, and time. He exhibits normal muscle tone. Coordination normal.   Skin: Skin is warm, dry and intact. He is not diaphoretic. No pallor.   Psychiatric: He has a normal mood and affect. His speech is normal and behavior is normal. Judgment and thought content normal. Cognition and memory are normal.   Nursing  note and vitals reviewed.      Assessment:       1. Tobacco use    2. Constipation, unspecified constipation type        Plan:         Tobacco use  -     SMOKING CESSATION EDUCATION PER RESPIRATORY; Future; Expected date: 07/21/2019    Constipation, unspecified constipation type  -     polyethylene glycol (GLYCOLAX) 17 gram/dose powder; Take 17 g by mouth once daily.  Dispense: 119 g; Refill: 0      Patient Instructions     Constipation (Adult)  Constipation means that you have bowel movements that are less frequent than usual. Stools often become very hard and difficult to pass.  Constipation is very common. At some point in life it affects almost everyone. Since everyone's bowel habits are different, what is constipation to one person may not be to another. Your healthcare provider may do tests to diagnose constipation. It depends on what he or she finds when evaluating you.    Symptoms of constipation include:  · Abdominal pain  · Bloating  · Vomiting  · Painful bowel movements  · Itching, swelling, bleeding, or pain around the anus  Causes  Constipation can have many causes. These include:  · Diet low in fiber  · Too much dairy  · Not drinking enough liquids  · Lack of exercise or physical activity. This is especially true for older adults.  · Changes in lifestyle or daily routine, including pregnancy, aging, work, and travel  · Frequent use or misuse of laxatives  · Ignoring the urge to have a bowel movement or delaying it until later  · Medicines, such as certain prescription pain medicines, iron supplements, antacids, certain antidepressants, and calcium supplements  · Diseases like irritable bowel syndrome, bowel obstructions, stroke, diabetes, thyroid disease, Parkinson disease, hemorrhoids, and colon cancer  Complications  Potential complications of constipation can include:  · Hemorrhoids  · Rectal bleeding from hemorrhoids or anal fissures (skin tears)  · Hernias  · Dependency on laxatives  · Chronic  constipation  · Fecal impaction  · Bowel obstruction or perforation  Home care  All treatment should be done after talking with your healthcare provider. This is especially true if you have another medical problems, are taking prescription medicines, or are an older adult. Treatment most often involves lifestyle changes. You may also need medicines. Your healthcare provider will tell you which will work best for you. Follow the advice below to help avoid this problem in the future.  Lifestyle changes  These lifestyle changes can help prevent constipation:  · Diet. Eat a high-fiber diet, with fresh fruit and vegetables, and reduce dairy intake, meats, and processed foods  · Fluids. It's important to get enough fluids each day. Drink plenty of water when you eat more fiber. If you are on diet that limits the amount of fluid you can have, talk about this with your healthcare provider.  · Regular exercise. Check with your healthcare provider first.  Medications  Take any medicines as directed. Some laxatives are safe to use only every now and then. Others can be taken on a regular basis. Talk with your doctor or pharmacist if you have questions.  Prescription pain medicines can cause constipation. If you are taking this kind of medicine, ask your healthcare provider if you should also take a stool softener.  Medicines you may take to treat constipation include:  · Fiber supplements  · Stool softeners  · Laxatives  · Enemas  · Rectal suppositories  Follow-up care  Follow up with your healthcare provider if symptoms don't get better in the next few days. You may need to have more tests or see a specialist.  Call 911  Call 911 if any of these occur:  · Trouble breathing  · Stiff, rigid abdomen that is severely painful to touch  · Confusion  · Fainting or loss of consciousness  · Rapid heart rate  · Chest pain  When to seek medical advice  Call your healthcare provider right away if any of these occur:  · Fever over 100.4°F  (38°C)  · Failure to resume normal bowel movements  · Pain in your abdomen or back gets worse  · Nausea or vomiting  · Swelling in your abdomen  · Blood in the stool  · Black, tarry stool  · Involuntary weight loss  · Weakness  Date Last Reviewed: 12/30/2015  © 7635-4095 [employer]. [address] [place] [address], [place] [address]. All rights reserved. This information is not intended as a substitute for professional medical care. Always follow your healthcare professional's instructions.

## 2019-12-11 ENCOUNTER — OFFICE VISIT (OUTPATIENT)
Dept: URGENT CARE | Facility: CLINIC | Age: 34
End: 2019-12-11
Payer: COMMERCIAL

## 2019-12-11 VITALS
WEIGHT: 227.06 LBS | DIASTOLIC BLOOD PRESSURE: 98 MMHG | OXYGEN SATURATION: 98 % | HEART RATE: 65 BPM | SYSTOLIC BLOOD PRESSURE: 141 MMHG | HEIGHT: 74 IN | RESPIRATION RATE: 20 BRPM | BODY MASS INDEX: 29.14 KG/M2 | TEMPERATURE: 99 F

## 2019-12-11 DIAGNOSIS — Z72.0 TOBACCO USE: ICD-10-CM

## 2019-12-11 DIAGNOSIS — R06.2 WHEEZING: ICD-10-CM

## 2019-12-11 DIAGNOSIS — J06.9 VIRAL UPPER RESPIRATORY TRACT INFECTION WITH COUGH: Primary | ICD-10-CM

## 2019-12-11 LAB
CTP QC/QA: YES
FLUAV AG NPH QL: NEGATIVE
FLUBV AG NPH QL: NEGATIVE

## 2019-12-11 PROCEDURE — 87804 POCT INFLUENZA A/B: ICD-10-PCS | Mod: QW,S$GLB,, | Performed by: PHYSICIAN ASSISTANT

## 2019-12-11 PROCEDURE — 99214 PR OFFICE/OUTPT VISIT, EST, LEVL IV, 30-39 MIN: ICD-10-PCS | Mod: 25,S$GLB,, | Performed by: PHYSICIAN ASSISTANT

## 2019-12-11 PROCEDURE — 99214 OFFICE O/P EST MOD 30 MIN: CPT | Mod: 25,S$GLB,, | Performed by: PHYSICIAN ASSISTANT

## 2019-12-11 PROCEDURE — 87804 INFLUENZA ASSAY W/OPTIC: CPT | Mod: QW,S$GLB,, | Performed by: PHYSICIAN ASSISTANT

## 2019-12-11 PROCEDURE — 96372 PR INJECTION,THERAP/PROPH/DIAG2ST, IM OR SUBCUT: ICD-10-PCS | Mod: S$GLB,,, | Performed by: EMERGENCY MEDICINE

## 2019-12-11 PROCEDURE — 96372 THER/PROPH/DIAG INJ SC/IM: CPT | Mod: S$GLB,,, | Performed by: EMERGENCY MEDICINE

## 2019-12-11 RX ORDER — BENZONATATE 200 MG/1
200 CAPSULE ORAL 3 TIMES DAILY PRN
Qty: 30 CAPSULE | Refills: 0 | Status: SHIPPED | OUTPATIENT
Start: 2019-12-11 | End: 2019-12-21

## 2019-12-11 RX ORDER — PROMETHAZINE HYDROCHLORIDE AND DEXTROMETHORPHAN HYDROBROMIDE 6.25; 15 MG/5ML; MG/5ML
5 SYRUP ORAL NIGHTLY PRN
Qty: 118 ML | Refills: 0 | Status: SHIPPED | OUTPATIENT
Start: 2019-12-11 | End: 2019-12-21

## 2019-12-11 RX ORDER — BETAMETHASONE SODIUM PHOSPHATE AND BETAMETHASONE ACETATE 3; 3 MG/ML; MG/ML
6 INJECTION, SUSPENSION INTRA-ARTICULAR; INTRALESIONAL; INTRAMUSCULAR; SOFT TISSUE
Status: COMPLETED | OUTPATIENT
Start: 2019-12-11 | End: 2019-12-11

## 2019-12-11 RX ADMIN — BETAMETHASONE SODIUM PHOSPHATE AND BETAMETHASONE ACETATE 6 MG: 3; 3 INJECTION, SUSPENSION INTRA-ARTICULAR; INTRALESIONAL; INTRAMUSCULAR; SOFT TISSUE at 09:12

## 2019-12-11 NOTE — LETTER
December 11, 2019      [facility] [address] [address]  Phone: [phone]  Fax: [phone]       Patient: Everett Bateman   YOB: 1985  Date of Visit: 12/11/2019    To Whom It May Concern:    Maci Bateman  was at [facility] on 12/11/2019. He may return to work/school on 12/13/2019 with (or sooner if symptoms improve and he goes 24 hours without fever without fever reducing medication) no restrictions. If you have any questions or concerns, or if I can be of further assistance, please do not hesitate to contact me.    Sincerely,    Edvin Farias PA-C

## 2019-12-11 NOTE — PATIENT INSTRUCTIONS
- Rest.    - Drink plenty of fluids.      - Viral upper respiratory infections typically run their course in 10-14 days.     - Tylenol or Ibuprofen as directed as needed for fever/pain. Avoid tylenol if you have a history of liver disease. Do not take ibuprofen if you have a history of GI bleeding, kidney disease, or if you take blood thinners.     - You can take over-the-counter claritin, zyrtec, allegra, or xyzal as directed. These are antihistamines that can help with runny nose, nasal congestion, sneezing, and helps to dry up post-nasal drip, which usually causes sore throat and cough.   - If you do NOT have high blood pressure, you may use a decongestant form (D) of this medication or if you do not take the D form, you can take sudafed  (pseudoephedrine) over the counter, which is a decongestant.    - You can use Flonase (fluticasone) nasal spray as directed for sinus congestion and postnasal drip. This is a steroid nasal spray that works locally over time to decrease the inflammation in your nose/sinuses and help with allergic symptoms. This is not an quick- relief spray like afrin, but it works well if used daily.  Discontinue if you develop nose bleed  - use nasal saline prior to Flonase.    - Use Ocean Spray Nasal Saline 1-3 puffs each nostril every 2-3 hours then blow out onto tissue. This is to irrigate the nasal passage way to clear the sinus openings. Use until sinus problem resolved.    - Take the tessalon (benzonatate) as needed as prescribed for cough   - Only take the promethazine- DM as directed, as needed at night for cough. This medication may cause drowsiness.     - you can take plain Mucinex (guaifenesin) 1200 mg twice a day to help loosen mucous    -warm salt water gargles can help with sore throat    - warm tea with honey can help with cough. Honey is a natural cough suppressant.    - You received a steroid shot today.  This can elevate your blood pressure, elevate your blood sugar, water  weight gain, nervous energy, redness to the face and dimpling of the skin where the shot goes in.   - Do not use steroids more than 3 times per year.   - If you have diabetes, please check you blood sugar frequently.  - If you have high blood pressure, please check your blood pressure frequently.     - Follow up with your PCP or specialty clinic as directed in the next 1-2 weeks if not improved or as needed.  You can call [phone] to schedule an appointment with the appropriate provider.      - Go to the ER if you develop new or worsening symptoms.     - You must understand that you have received an Urgent Care treatment only and that you may be released before all of your medical problems are known or treated.   - You, the patient, will arrange for follow up care as instructed.   - If your condition worsens or fails to improve we recommend that you receive another evaluation at the ER immediately or contact your PCP to discuss your concerns or return here.         Viral Upper Respiratory Illness with Wheezing (Adult)  You have a viral upper respiratory illness (URI), which is another term for the common cold. When the infection causes a lot of irritation, the air passages can go into spasm. This causes wheezing and shortness of breath.    This illness is contagious during the first few days. It is spread through the air by coughing and sneezing. It may also be spread by direct contact (touching the sick person and then touching your own eyes, nose, or mouth). Frequent handwashing will decrease the risk.  Most viral illnesses go away within 7 to 10 days with rest and simple home remedies. Sometimes the illness may last for several weeks. Antibiotics will not kill a virus, and they are generally not prescribed for this condition.  Home care  · If symptoms are severe, rest at home for the first 2 to 3 days. When you resume activity, don't let yourself get too tired.  · Avoid being exposed to cigarette smoke  (yours or others).  · You may use acetaminophen or ibuprofen to control pain and fever, unless another medicine was prescribed. (Note: If you have chronic liver or kidney disease, have ever had a stomach ulcer or gastrointestinal bleeding, or are taking blood-thinning medicines, talk with your healthcare provider before using these medicines.) Aspirin should never be given to anyone under 18 years of age who is ill with a viral infection or fever. It may cause severe liver or brain damage.  · Your appetite may be poor, so a light diet is fine. Avoid dehydration by drinking 6 to 8 glasses of fluids per day (water, soft drinks, juices, tea, or soup). Extra fluids will help loosen secretions in the nose and lungs.  · Over-the-counter cold medicines will not shorten the length of time youre sick, but they may be helpful for the following symptoms: cough, sore throat, and nasal and sinus congestion. (Note: Do not use decongestants if you have high blood pressure.)  Follow-up care  Follow up with your healthcare provider, or as advised.  When to seek medical advice  Call your healthcare provider right away if any of these occur:  · Cough with lots of colored sputum (mucus)  · Severe headache; face, neck, or ear pain  · Difficulty swallowing due to throat pain  · Fever of 100.4ºF (38ºC) or higher, or as directed by your healthcare provider  Call 911, or get immediate medical care  Call emergency services right away if any of these occur:  · Chest pain, shortness of breath, worsening wheezing, or difficulty breathing  · Coughing up blood  · Inability to swallow due to throat pain  Date Last Reviewed: 9/13/2015 © 2000-2017 [employer]. [address] [place] [address], [place] [address]. All rights reserved. This information is not intended as a substitute for professional medical care. Always follow your healthcare professional's instructions.

## 2019-12-11 NOTE — PROGRESS NOTES
"Subjective:       Patient ID: Everett Bateman is a 34 y.o. male.    Vitals:  height is 6' 2" (1.88 m) and weight is 103 kg (227 lb 1.2 oz). His temperature is 98.8 °F (37.1 °C). His blood pressure is 141/98 (abnormal) and his pulse is 65. His respiration is 20 and oxygen saturation is 98%.     Chief Complaint: Cough    Patient presents with cough, chest congestion, nasal congestion/runny nose, sore throat, body aches that began 3 days ago.  No fever, chest pain, shortness of breath.  Patient does smoke.    Cough   This is a new problem. The current episode started in the past 7 days. The problem has been gradually worsening. The problem occurs constantly. The cough is productive of purulent sputum. Associated symptoms include ear pain, headaches, myalgias and postnasal drip. Pertinent negatives include no chest pain, chills, fever, hemoptysis, rash, sore throat, shortness of breath or wheezing. Nothing aggravates the symptoms. He has tried nothing for the symptoms. The treatment provided no relief.       Constitution: Negative for chills, sweating, fatigue and fever.   HENT: Positive for ear pain, congestion, postnasal drip and sinus pressure. Negative for foreign body in ear, tinnitus, hearing loss, sinus pain, sore throat and trouble swallowing.    Neck: Negative for neck pain, neck stiffness and painful lymph nodes.   Cardiovascular: Negative for chest pain, leg swelling, palpitations and sob on exertion.   Eyes: Negative for double vision and blurred vision.   Respiratory: Positive for cough and sputum production. Negative for sleep apnea, chest tightness, bloody sputum, COPD, shortness of breath, stridor, wheezing and asthma.    Gastrointestinal: Negative for nausea, vomiting and diarrhea.   Genitourinary: Negative for dysuria, frequency and urgency.   Musculoskeletal: Positive for muscle ache. Negative for joint pain, joint swelling and muscle cramps.   Skin: Negative for color change, pale, rash and wound. "   Allergic/Immunologic: Negative for seasonal allergies and asthma.   Neurological: Positive for headaches. Negative for dizziness, history of vertigo, light-headedness, passing out, facial drooping, speech difficulty, coordination disturbances, loss of balance, history of migraines, disorientation, altered mental status, loss of consciousness, numbness, tingling and seizures.   Hematologic/Lymphatic: Negative for swollen lymph nodes, easy bruising/bleeding and history of blood clots. Does not bruise/bleed easily.   Psychiatric/Behavioral: Negative for altered mental status, disorientation, nervous/anxious, sleep disturbance and depression. The patient is not nervous/anxious.        Objective:      Physical Exam   Constitutional: He is oriented to person, place, and time. He appears well-developed and well-nourished. He is cooperative.  Non-toxic appearance. He does not have a sickly appearance. He does not appear ill. No distress.   HENT:   Head: Normocephalic and atraumatic.   Right Ear: Hearing, tympanic membrane, external ear and ear canal normal.   Left Ear: Hearing, tympanic membrane, external ear and ear canal normal.   Nose: Mucosal edema present. No rhinorrhea or nasal deformity. No epistaxis. Right sinus exhibits maxillary sinus tenderness. Right sinus exhibits no frontal sinus tenderness. Left sinus exhibits maxillary sinus tenderness. Left sinus exhibits no frontal sinus tenderness.   Mouth/Throat: Uvula is midline, oropharynx is clear and moist and mucous membranes are normal. No trismus in the jaw. Normal dentition. No uvula swelling. No oropharyngeal exudate, posterior oropharyngeal edema, posterior oropharyngeal erythema, tonsillar abscesses or cobblestoning. Tonsils are 1+ on the right. Tonsils are 1+ on the left. No tonsillar exudate.   Eyes: Conjunctivae and lids are normal. No scleral icterus.   Neck: Trachea normal, full passive range of motion without pain and phonation normal. Neck supple. No  neck rigidity. No edema and no erythema present.   Cardiovascular: Normal rate, regular rhythm, normal heart sounds, intact distal pulses and normal pulses.   Pulmonary/Chest: Effort normal. No accessory muscle usage or stridor. No tachypnea and no bradypnea. No respiratory distress. He has no decreased breath sounds. He has wheezes (Diffuse expiratory). He has no rhonchi. He has no rales.   Abdominal: Normal appearance.   Musculoskeletal: Normal range of motion. He exhibits no edema or deformity.   Lymphadenopathy:        Head (right side): No submandibular, no preauricular and no posterior auricular adenopathy present.        Head (left side): No submandibular, no preauricular and no posterior auricular adenopathy present.     He has no cervical adenopathy.   Neurological: He is alert and oriented to person, place, and time. He exhibits normal muscle tone. Coordination normal.   Skin: Skin is warm, dry, intact, not diaphoretic and not pale.   Psychiatric: He has a normal mood and affect. His speech is normal and behavior is normal. Judgment and thought content normal. Cognition and memory are normal.   Nursing note and vitals reviewed.          Results for orders placed or performed in visit on 12/11/19   POCT Influenza A/B   Result Value Ref Range    Rapid Influenza A Ag Negative Negative    Rapid Influenza B Ag Negative Negative     Acceptable Yes     Patient requesting steroid injection declines oral prednisone as alternative.    Assessment:       1. Viral upper respiratory tract infection with cough    2. Wheezing    3. Tobacco use        Plan:         Viral upper respiratory tract infection with cough  -     POCT Influenza A/B  -     betamethasone acetate-betamethasone sodium phosphate injection 6 mg  -     benzonatate (TESSALON) 200 MG capsule; Take 1 capsule (200 mg total) by mouth 3 (three) times daily as needed for Cough.  Dispense: 30 capsule; Refill: 0  -     promethazine-dextromethorphan  (PROMETHAZINE-DM) 6.25-15 mg/5 mL Syrp; Take 5 mLs by mouth nightly as needed.  Dispense: 118 mL; Refill: 0    Wheezing  -     betamethasone acetate-betamethasone sodium phosphate injection 6 mg    Tobacco use  -     Ambulatory referral to Smoking Cessation Program      Patient Instructions   - Rest.    - Drink plenty of fluids.      - Viral upper respiratory infections typically run their course in 10-14 days.     - Tylenol or Ibuprofen as directed as needed for fever/pain. Avoid tylenol if you have a history of liver disease. Do not take ibuprofen if you have a history of GI bleeding, kidney disease, or if you take blood thinners.     - You can take over-the-counter claritin, zyrtec, allegra, or xyzal as directed. These are antihistamines that can help with runny nose, nasal congestion, sneezing, and helps to dry up post-nasal drip, which usually causes sore throat and cough.   - If you do NOT have high blood pressure, you may use a decongestant form (D) of this medication or if you do not take the D form, you can take sudafed  (pseudoephedrine) over the counter, which is a decongestant.    - You can use Flonase (fluticasone) nasal spray as directed for sinus congestion and postnasal drip. This is a steroid nasal spray that works locally over time to decrease the inflammation in your nose/sinuses and help with allergic symptoms. This is not an quick- relief spray like afrin, but it works well if used daily.  Discontinue if you develop nose bleed  - use nasal saline prior to Flonase.    - Use Ocean Spray Nasal Saline 1-3 puffs each nostril every 2-3 hours then blow out onto tissue. This is to irrigate the nasal passage way to clear the sinus openings. Use until sinus problem resolved.    - Take the tessalon (benzonatate) as needed as prescribed for cough   - Only take the promethazine- DM as directed, as needed at night for cough. This medication may cause drowsiness.     - you can take plain Mucinex (guaifenesin)  1200 mg twice a day to help loosen mucous    -warm salt water gargles can help with sore throat    - warm tea with honey can help with cough. Honey is a natural cough suppressant.    - You received a steroid shot today.  This can elevate your blood pressure, elevate your blood sugar, water weight gain, nervous energy, redness to the face and dimpling of the skin where the shot goes in.   - Do not use steroids more than 3 times per year.   - If you have diabetes, please check you blood sugar frequently.  - If you have high blood pressure, please check your blood pressure frequently.     - Follow up with your PCP or specialty clinic as directed in the next 1-2 weeks if not improved or as needed.  You can call [phone] to schedule an appointment with the appropriate provider.      - Go to the ER if you develop new or worsening symptoms.     - You must understand that you have received an Urgent Care treatment only and that you may be released before all of your medical problems are known or treated.   - You, the patient, will arrange for follow up care as instructed.   - If your condition worsens or fails to improve we recommend that you receive another evaluation at the ER immediately or contact your PCP to discuss your concerns or return here.         Viral Upper Respiratory Illness with Wheezing (Adult)  You have a viral upper respiratory illness (URI), which is another term for the common cold. When the infection causes a lot of irritation, the air passages can go into spasm. This causes wheezing and shortness of breath.    This illness is contagious during the first few days. It is spread through the air by coughing and sneezing. It may also be spread by direct contact (touching the sick person and then touching your own eyes, nose, or mouth). Frequent handwashing will decrease the risk.  Most viral illnesses go away within 7 to 10 days with rest and simple home remedies. Sometimes the illness may last for  several weeks. Antibiotics will not kill a virus, and they are generally not prescribed for this condition.  Home care  · If symptoms are severe, rest at home for the first 2 to 3 days. When you resume activity, don't let yourself get too tired.  · Avoid being exposed to cigarette smoke (yours or others).  · You may use acetaminophen or ibuprofen to control pain and fever, unless another medicine was prescribed. (Note: If you have chronic liver or kidney disease, have ever had a stomach ulcer or gastrointestinal bleeding, or are taking blood-thinning medicines, talk with your healthcare provider before using these medicines.) Aspirin should never be given to anyone under 18 years of age who is ill with a viral infection or fever. It may cause severe liver or brain damage.  · Your appetite may be poor, so a light diet is fine. Avoid dehydration by drinking 6 to 8 glasses of fluids per day (water, soft drinks, juices, tea, or soup). Extra fluids will help loosen secretions in the nose and lungs.  · Over-the-counter cold medicines will not shorten the length of time youre sick, but they may be helpful for the following symptoms: cough, sore throat, and nasal and sinus congestion. (Note: Do not use decongestants if you have high blood pressure.)  Follow-up care  Follow up with your healthcare provider, or as advised.  When to seek medical advice  Call your healthcare provider right away if any of these occur:  · Cough with lots of colored sputum (mucus)  · Severe headache; face, neck, or ear pain  · Difficulty swallowing due to throat pain  · Fever of 100.4ºF (38ºC) or higher, or as directed by your healthcare provider  Call 911, or get immediate medical care  Call emergency services right away if any of these occur:  · Chest pain, shortness of breath, worsening wheezing, or difficulty breathing  · Coughing up blood  · Inability to swallow due to throat pain  Date Last Reviewed: 9/13/2015  © 6893-5887 [employer]. [address] [place] [address], [place] [address]. All rights reserved. This information is not intended as a substitute for professional medical care. Always follow your healthcare professional's instructions.

## 2020-04-13 ENCOUNTER — TELEPHONE (OUTPATIENT)
Dept: CARDIOLOGY | Facility: CLINIC | Age: 35
End: 2020-04-13

## 2020-04-13 RX ORDER — HYDROCHLOROTHIAZIDE 25 MG/1
25 TABLET ORAL DAILY
Qty: 90 TABLET | Refills: 3 | Status: SHIPPED | OUTPATIENT
Start: 2020-04-13 | End: 2021-04-20 | Stop reason: SDUPTHER

## 2020-04-13 RX ORDER — LOSARTAN POTASSIUM 50 MG/1
TABLET ORAL
Qty: 90 TABLET | Refills: 3 | OUTPATIENT
Start: 2020-04-13

## 2020-04-13 RX ORDER — LOSARTAN POTASSIUM 100 MG/1
100 TABLET ORAL DAILY
Qty: 90 TABLET | Refills: 3 | Status: SHIPPED | OUTPATIENT
Start: 2020-04-13 | End: 2021-04-20 | Stop reason: SDUPTHER

## 2020-04-13 NOTE — TELEPHONE ENCOUNTER
I spoke with pt regarding a refill request for his antihypertensives  I confirmed the correct doses  Pt reports BP is OK at home--130/90

## 2021-01-12 ENCOUNTER — HOSPITAL ENCOUNTER (EMERGENCY)
Facility: OTHER | Age: 36
Discharge: HOME | End: 2021-01-12
Attending: EMERGENCY MEDICINE
Payer: MEDICAID

## 2021-01-12 VITALS
TEMPERATURE: 98 F | HEART RATE: 84 BPM | WEIGHT: 205 LBS | DIASTOLIC BLOOD PRESSURE: 91 MMHG | HEIGHT: 75 IN | BODY MASS INDEX: 25.49 KG/M2 | RESPIRATION RATE: 20 BRPM | OXYGEN SATURATION: 97 % | SYSTOLIC BLOOD PRESSURE: 168 MMHG

## 2021-01-12 DIAGNOSIS — I10 ESSENTIAL HYPERTENSION: Primary | ICD-10-CM

## 2021-01-12 DIAGNOSIS — F41.9 ANXIETY: ICD-10-CM

## 2021-01-12 DIAGNOSIS — R51.9 ACUTE NONINTRACTABLE HEADACHE, UNSPECIFIED HEADACHE TYPE: ICD-10-CM

## 2021-01-12 LAB
ANION GAP SERPL CALC-SCNC: 9 MMOL/L (ref 8–16)
BASOPHILS # BLD AUTO: 0.06 K/UL (ref 0–0.2)
BASOPHILS NFR BLD: 0.9 % (ref 0–1.9)
BUN SERPL-MCNC: 8 MG/DL (ref 6–20)
CALCIUM SERPL-MCNC: 9.6 MG/DL (ref 8.7–10.5)
CHLORIDE SERPL-SCNC: 103 MMOL/L (ref 95–110)
CO2 SERPL-SCNC: 30 MMOL/L (ref 23–29)
CREAT SERPL-MCNC: 0.8 MG/DL (ref 0.5–1.4)
DIFFERENTIAL METHOD: ABNORMAL
EOSINOPHIL # BLD AUTO: 0.1 K/UL (ref 0–0.5)
EOSINOPHIL NFR BLD: 1.3 % (ref 0–8)
ERYTHROCYTE [DISTWIDTH] IN BLOOD BY AUTOMATED COUNT: 12.8 % (ref 11.5–14.5)
EST. GFR  (AFRICAN AMERICAN): >60 ML/MIN/1.73 M^2
EST. GFR  (NON AFRICAN AMERICAN): >60 ML/MIN/1.73 M^2
GLUCOSE SERPL-MCNC: 113 MG/DL (ref 70–110)
HCT VFR BLD AUTO: 44.5 % (ref 40–54)
HCV AB SERPL QL IA: NEGATIVE
HGB BLD-MCNC: 15 G/DL (ref 14–18)
IMM GRANULOCYTES # BLD AUTO: 0.02 K/UL (ref 0–0.04)
IMM GRANULOCYTES NFR BLD AUTO: 0.3 % (ref 0–0.5)
LYMPHOCYTES # BLD AUTO: 1.1 K/UL (ref 1–4.8)
LYMPHOCYTES NFR BLD: 16.9 % (ref 18–48)
MCH RBC QN AUTO: 28.8 PG (ref 27–31)
MCHC RBC AUTO-ENTMCNC: 33.7 G/DL (ref 32–36)
MCV RBC AUTO: 85 FL (ref 82–98)
MONOCYTES # BLD AUTO: 0.5 K/UL (ref 0.3–1)
MONOCYTES NFR BLD: 8.4 % (ref 4–15)
NEUTROPHILS # BLD AUTO: 4.6 K/UL (ref 1.8–7.7)
NEUTROPHILS NFR BLD: 72.2 % (ref 38–73)
NRBC BLD-RTO: 0 /100 WBC
PLATELET # BLD AUTO: 262 K/UL (ref 150–350)
PMV BLD AUTO: 10.7 FL (ref 9.2–12.9)
POTASSIUM SERPL-SCNC: 3.6 MMOL/L (ref 3.5–5.1)
RBC # BLD AUTO: 5.21 M/UL (ref 4.6–6.2)
SODIUM SERPL-SCNC: 142 MMOL/L (ref 136–145)
WBC # BLD AUTO: 6.32 K/UL (ref 3.9–12.7)

## 2021-01-12 PROCEDURE — 86803 HEPATITIS C AB TEST: CPT

## 2021-01-12 PROCEDURE — 80048 BASIC METABOLIC PNL TOTAL CA: CPT

## 2021-01-12 PROCEDURE — 25000003 PHARM REV CODE 250: Performed by: NURSE PRACTITIONER

## 2021-01-12 PROCEDURE — 63600175 PHARM REV CODE 636 W HCPCS: Performed by: NURSE PRACTITIONER

## 2021-01-12 PROCEDURE — 99284 EMERGENCY DEPT VISIT MOD MDM: CPT | Mod: 25

## 2021-01-12 PROCEDURE — 85025 COMPLETE CBC W/AUTO DIFF WBC: CPT

## 2021-01-12 PROCEDURE — 96372 THER/PROPH/DIAG INJ SC/IM: CPT

## 2021-01-12 RX ORDER — HYDROXYZINE HYDROCHLORIDE 25 MG/1
25 TABLET, FILM COATED ORAL 3 TIMES DAILY PRN
Qty: 20 TABLET | Refills: 0 | Status: SHIPPED | OUTPATIENT
Start: 2021-01-12 | End: 2021-10-25

## 2021-01-12 RX ORDER — BUTALBITAL, ACETAMINOPHEN AND CAFFEINE 50; 325; 40 MG/1; MG/1; MG/1
2 TABLET ORAL
Status: COMPLETED | OUTPATIENT
Start: 2021-01-12 | End: 2021-01-12

## 2021-01-12 RX ORDER — KETOROLAC TROMETHAMINE 10 MG/1
10 TABLET, FILM COATED ORAL
Status: DISCONTINUED | OUTPATIENT
Start: 2021-01-12 | End: 2021-01-12

## 2021-01-12 RX ORDER — KETOROLAC TROMETHAMINE 30 MG/ML
30 INJECTION, SOLUTION INTRAMUSCULAR; INTRAVENOUS
Status: COMPLETED | OUTPATIENT
Start: 2021-01-12 | End: 2021-01-12

## 2021-01-12 RX ADMIN — BUTALBITAL, ACETAMINOPHEN, AND CAFFEINE 2 TABLET: 50; 325; 40 TABLET ORAL at 10:01

## 2021-01-12 RX ADMIN — KETOROLAC TROMETHAMINE 30 MG: 30 INJECTION, SOLUTION INTRAMUSCULAR at 12:01

## 2021-04-20 RX ORDER — HYDROCHLOROTHIAZIDE 25 MG/1
25 TABLET ORAL DAILY
Qty: 90 TABLET | Refills: 0 | Status: SHIPPED | OUTPATIENT
Start: 2021-04-20 | End: 2021-07-22

## 2021-04-20 RX ORDER — LOSARTAN POTASSIUM 100 MG/1
100 TABLET ORAL DAILY
Qty: 90 TABLET | Refills: 0 | Status: SHIPPED | OUTPATIENT
Start: 2021-04-20 | End: 2021-07-22

## 2021-07-29 ENCOUNTER — TELEPHONE (OUTPATIENT)
Dept: CARDIOLOGY | Facility: CLINIC | Age: 36
End: 2021-07-29

## 2021-08-17 ENCOUNTER — OFFICE VISIT (OUTPATIENT)
Dept: PSYCHIATRY | Facility: CLINIC | Age: 36
End: 2021-08-17
Payer: MEDICAID

## 2021-08-17 DIAGNOSIS — F11.11 HISTORY OF OPIOID ABUSE: ICD-10-CM

## 2021-08-17 DIAGNOSIS — F41.9 ANXIETY DISORDER, UNSPECIFIED TYPE: Primary | ICD-10-CM

## 2021-08-17 DIAGNOSIS — F90.9 ATTENTION DEFICIT HYPERACTIVITY DISORDER (ADHD), UNSPECIFIED ADHD TYPE: ICD-10-CM

## 2021-08-17 DIAGNOSIS — G47.00 INSOMNIA DISORDER WITH NON-SLEEP DISORDER MENTAL COMORBIDITY: ICD-10-CM

## 2021-08-17 PROCEDURE — 99212 OFFICE O/P EST SF 10 MIN: CPT | Mod: PBBFAC | Performed by: NURSE PRACTITIONER

## 2021-08-17 PROCEDURE — 99205 PR OFFICE/OUTPT VISIT, NEW, LEVL V, 60-74 MIN: ICD-10-PCS | Mod: S$PBB,SA,HB, | Performed by: NURSE PRACTITIONER

## 2021-08-17 PROCEDURE — 99205 OFFICE O/P NEW HI 60 MIN: CPT | Mod: S$PBB,SA,HB, | Performed by: NURSE PRACTITIONER

## 2021-08-17 PROCEDURE — 99999 PR PBB SHADOW E&M-EST. PATIENT-LVL II: ICD-10-PCS | Mod: PBBFAC,SA,HB, | Performed by: NURSE PRACTITIONER

## 2021-08-17 PROCEDURE — 99999 PR PBB SHADOW E&M-EST. PATIENT-LVL II: CPT | Mod: PBBFAC,SA,HB, | Performed by: NURSE PRACTITIONER

## 2021-08-17 RX ORDER — VENLAFAXINE HYDROCHLORIDE 75 MG/1
75 CAPSULE, EXTENDED RELEASE ORAL DAILY
Qty: 30 CAPSULE | Refills: 11 | Status: SHIPPED | OUTPATIENT
Start: 2021-08-17 | End: 2022-05-19

## 2021-08-17 RX ORDER — ATOMOXETINE 25 MG/1
25 CAPSULE ORAL DAILY
Qty: 30 CAPSULE | Refills: 11 | Status: SHIPPED | OUTPATIENT
Start: 2021-08-17 | End: 2022-05-19

## 2021-08-17 RX ORDER — TRAZODONE HYDROCHLORIDE 50 MG/1
50 TABLET ORAL NIGHTLY
Qty: 30 TABLET | Refills: 11 | Status: SHIPPED | OUTPATIENT
Start: 2021-08-17 | End: 2022-08-17

## 2021-08-17 RX ORDER — NALTREXONE HYDROCHLORIDE 50 MG/1
50 TABLET, FILM COATED ORAL DAILY
Qty: 30 TABLET | Refills: 11 | Status: SHIPPED | OUTPATIENT
Start: 2021-08-17 | End: 2021-09-16

## 2021-09-20 ENCOUNTER — TELEPHONE (OUTPATIENT)
Dept: CARDIOLOGY | Facility: CLINIC | Age: 36
End: 2021-09-20

## 2021-10-13 ENCOUNTER — TELEPHONE (OUTPATIENT)
Dept: CARDIOLOGY | Facility: CLINIC | Age: 36
End: 2021-10-13

## 2021-10-25 ENCOUNTER — OFFICE VISIT (OUTPATIENT)
Dept: CARDIOLOGY | Facility: CLINIC | Age: 36
End: 2021-10-25
Payer: COMMERCIAL

## 2021-10-25 VITALS
BODY MASS INDEX: 26.86 KG/M2 | SYSTOLIC BLOOD PRESSURE: 172 MMHG | OXYGEN SATURATION: 97 % | HEIGHT: 75 IN | WEIGHT: 216.06 LBS | HEART RATE: 78 BPM | DIASTOLIC BLOOD PRESSURE: 112 MMHG

## 2021-10-25 DIAGNOSIS — I10 ESSENTIAL HYPERTENSION: Primary | ICD-10-CM

## 2021-10-25 PROCEDURE — 1160F RVW MEDS BY RX/DR IN RCRD: CPT | Mod: CPTII,S$GLB,, | Performed by: INTERNAL MEDICINE

## 2021-10-25 PROCEDURE — 99214 OFFICE O/P EST MOD 30 MIN: CPT | Mod: 25,S$GLB,, | Performed by: INTERNAL MEDICINE

## 2021-10-25 PROCEDURE — 3077F PR MOST RECENT SYSTOLIC BLOOD PRESSURE >= 140 MM HG: ICD-10-PCS | Mod: CPTII,S$GLB,, | Performed by: INTERNAL MEDICINE

## 2021-10-25 PROCEDURE — 1159F PR MEDICATION LIST DOCUMENTED IN MEDICAL RECORD: ICD-10-PCS | Mod: CPTII,S$GLB,, | Performed by: INTERNAL MEDICINE

## 2021-10-25 PROCEDURE — 3080F PR MOST RECENT DIASTOLIC BLOOD PRESSURE >= 90 MM HG: ICD-10-PCS | Mod: CPTII,S$GLB,, | Performed by: INTERNAL MEDICINE

## 2021-10-25 PROCEDURE — 4010F ACE/ARB THERAPY RXD/TAKEN: CPT | Mod: CPTII,S$GLB,, | Performed by: INTERNAL MEDICINE

## 2021-10-25 PROCEDURE — 99999 PR PBB SHADOW E&M-EST. PATIENT-LVL III: CPT | Mod: PBBFAC,,, | Performed by: INTERNAL MEDICINE

## 2021-10-25 PROCEDURE — 93000 EKG 12-LEAD: ICD-10-PCS | Mod: S$GLB,,, | Performed by: INTERNAL MEDICINE

## 2021-10-25 PROCEDURE — 3008F PR BODY MASS INDEX (BMI) DOCUMENTED: ICD-10-PCS | Mod: CPTII,S$GLB,, | Performed by: INTERNAL MEDICINE

## 2021-10-25 PROCEDURE — 99214 PR OFFICE/OUTPT VISIT, EST, LEVL IV, 30-39 MIN: ICD-10-PCS | Mod: 25,S$GLB,, | Performed by: INTERNAL MEDICINE

## 2021-10-25 PROCEDURE — 99999 PR PBB SHADOW E&M-EST. PATIENT-LVL III: ICD-10-PCS | Mod: PBBFAC,,, | Performed by: INTERNAL MEDICINE

## 2021-10-25 PROCEDURE — 1160F PR REVIEW ALL MEDS BY PRESCRIBER/CLIN PHARMACIST DOCUMENTED: ICD-10-PCS | Mod: CPTII,S$GLB,, | Performed by: INTERNAL MEDICINE

## 2021-10-25 PROCEDURE — 93000 ELECTROCARDIOGRAM COMPLETE: CPT | Mod: S$GLB,,, | Performed by: INTERNAL MEDICINE

## 2021-10-25 PROCEDURE — 3080F DIAST BP >= 90 MM HG: CPT | Mod: CPTII,S$GLB,, | Performed by: INTERNAL MEDICINE

## 2021-10-25 PROCEDURE — 1159F MED LIST DOCD IN RCRD: CPT | Mod: CPTII,S$GLB,, | Performed by: INTERNAL MEDICINE

## 2021-10-25 PROCEDURE — 3008F BODY MASS INDEX DOCD: CPT | Mod: CPTII,S$GLB,, | Performed by: INTERNAL MEDICINE

## 2021-10-25 PROCEDURE — 3077F SYST BP >= 140 MM HG: CPT | Mod: CPTII,S$GLB,, | Performed by: INTERNAL MEDICINE

## 2021-10-25 PROCEDURE — 4010F PR ACE/ARB THEARPY RXD/TAKEN: ICD-10-PCS | Mod: CPTII,S$GLB,, | Performed by: INTERNAL MEDICINE

## 2021-10-25 RX ORDER — LOSARTAN POTASSIUM 100 MG/1
100 TABLET ORAL DAILY
Qty: 90 TABLET | Refills: 3 | Status: SHIPPED | OUTPATIENT
Start: 2021-10-25 | End: 2021-12-01 | Stop reason: SDUPTHER

## 2021-10-25 RX ORDER — HYDROCHLOROTHIAZIDE 25 MG/1
25 TABLET ORAL DAILY
Qty: 90 TABLET | Refills: 3 | Status: SHIPPED | OUTPATIENT
Start: 2021-10-25 | End: 2021-12-01 | Stop reason: SDUPTHER

## 2021-11-29 ENCOUNTER — LAB VISIT (OUTPATIENT)
Dept: LAB | Facility: HOSPITAL | Age: 36
End: 2021-11-29
Attending: INTERNAL MEDICINE
Payer: COMMERCIAL

## 2021-11-29 DIAGNOSIS — I10 ESSENTIAL HYPERTENSION: ICD-10-CM

## 2021-11-29 LAB
ALBUMIN SERPL BCP-MCNC: 4.3 G/DL (ref 3.5–5.2)
ALP SERPL-CCNC: 59 U/L (ref 55–135)
ALT SERPL W/O P-5'-P-CCNC: 24 U/L (ref 10–44)
ANION GAP SERPL CALC-SCNC: 10 MMOL/L (ref 8–16)
AST SERPL-CCNC: 23 U/L (ref 10–40)
BASOPHILS # BLD AUTO: 0.07 K/UL (ref 0–0.2)
BASOPHILS NFR BLD: 1.1 % (ref 0–1.9)
BILIRUB SERPL-MCNC: 1.1 MG/DL (ref 0.1–1)
BUN SERPL-MCNC: 17 MG/DL (ref 6–20)
CALCIUM SERPL-MCNC: 9.6 MG/DL (ref 8.7–10.5)
CHLORIDE SERPL-SCNC: 100 MMOL/L (ref 95–110)
CHOLEST SERPL-MCNC: 210 MG/DL (ref 120–199)
CHOLEST/HDLC SERPL: 4.8 {RATIO} (ref 2–5)
CO2 SERPL-SCNC: 29 MMOL/L (ref 23–29)
CREAT SERPL-MCNC: 1 MG/DL (ref 0.5–1.4)
DIFFERENTIAL METHOD: ABNORMAL
EOSINOPHIL # BLD AUTO: 0.4 K/UL (ref 0–0.5)
EOSINOPHIL NFR BLD: 6 % (ref 0–8)
ERYTHROCYTE [DISTWIDTH] IN BLOOD BY AUTOMATED COUNT: 12 % (ref 11.5–14.5)
EST. GFR  (AFRICAN AMERICAN): >60 ML/MIN/1.73 M^2
EST. GFR  (NON AFRICAN AMERICAN): >60 ML/MIN/1.73 M^2
GLUCOSE SERPL-MCNC: 99 MG/DL (ref 70–110)
HCT VFR BLD AUTO: 45.4 % (ref 40–54)
HDLC SERPL-MCNC: 44 MG/DL (ref 40–75)
HDLC SERPL: 21 % (ref 20–50)
HGB BLD-MCNC: 16.5 G/DL (ref 14–18)
IMM GRANULOCYTES # BLD AUTO: 0.02 K/UL (ref 0–0.04)
IMM GRANULOCYTES NFR BLD AUTO: 0.3 % (ref 0–0.5)
LDLC SERPL CALC-MCNC: 132.6 MG/DL (ref 63–159)
LYMPHOCYTES # BLD AUTO: 1.6 K/UL (ref 1–4.8)
LYMPHOCYTES NFR BLD: 24.3 % (ref 18–48)
MCH RBC QN AUTO: 31.4 PG (ref 27–31)
MCHC RBC AUTO-ENTMCNC: 36.3 G/DL (ref 32–36)
MCV RBC AUTO: 86 FL (ref 82–98)
MONOCYTES # BLD AUTO: 0.8 K/UL (ref 0.3–1)
MONOCYTES NFR BLD: 12.1 % (ref 4–15)
NEUTROPHILS # BLD AUTO: 3.7 K/UL (ref 1.8–7.7)
NEUTROPHILS NFR BLD: 56.2 % (ref 38–73)
NONHDLC SERPL-MCNC: 166 MG/DL
NRBC BLD-RTO: 0 /100 WBC
PLATELET # BLD AUTO: 274 K/UL (ref 150–450)
PMV BLD AUTO: 10.3 FL (ref 9.2–12.9)
POTASSIUM SERPL-SCNC: 3.4 MMOL/L (ref 3.5–5.1)
PROT SERPL-MCNC: 8.1 G/DL (ref 6–8.4)
RBC # BLD AUTO: 5.26 M/UL (ref 4.6–6.2)
SODIUM SERPL-SCNC: 139 MMOL/L (ref 136–145)
TRIGL SERPL-MCNC: 167 MG/DL (ref 30–150)
TSH SERPL DL<=0.005 MIU/L-ACNC: 1.81 UIU/ML (ref 0.4–4)
WBC # BLD AUTO: 6.62 K/UL (ref 3.9–12.7)

## 2021-11-29 PROCEDURE — 84443 ASSAY THYROID STIM HORMONE: CPT | Performed by: INTERNAL MEDICINE

## 2021-11-29 PROCEDURE — 36415 COLL VENOUS BLD VENIPUNCTURE: CPT | Performed by: INTERNAL MEDICINE

## 2021-11-29 PROCEDURE — 85025 COMPLETE CBC W/AUTO DIFF WBC: CPT | Performed by: INTERNAL MEDICINE

## 2021-11-29 PROCEDURE — 80053 COMPREHEN METABOLIC PANEL: CPT | Performed by: INTERNAL MEDICINE

## 2021-11-29 PROCEDURE — 80061 LIPID PANEL: CPT | Performed by: INTERNAL MEDICINE

## 2021-12-01 ENCOUNTER — OFFICE VISIT (OUTPATIENT)
Dept: CARDIOLOGY | Facility: CLINIC | Age: 36
End: 2021-12-01
Payer: COMMERCIAL

## 2021-12-01 VITALS
SYSTOLIC BLOOD PRESSURE: 139 MMHG | HEIGHT: 75 IN | HEART RATE: 89 BPM | OXYGEN SATURATION: 99 % | DIASTOLIC BLOOD PRESSURE: 100 MMHG | BODY MASS INDEX: 28.15 KG/M2 | WEIGHT: 226.44 LBS

## 2021-12-01 DIAGNOSIS — I10 ESSENTIAL HYPERTENSION: Primary | ICD-10-CM

## 2021-12-01 PROCEDURE — 99213 OFFICE O/P EST LOW 20 MIN: CPT | Mod: S$GLB,,, | Performed by: INTERNAL MEDICINE

## 2021-12-01 PROCEDURE — 99213 PR OFFICE/OUTPT VISIT, EST, LEVL III, 20-29 MIN: ICD-10-PCS | Mod: S$GLB,,, | Performed by: INTERNAL MEDICINE

## 2021-12-01 PROCEDURE — 99999 PR PBB SHADOW E&M-EST. PATIENT-LVL III: ICD-10-PCS | Mod: PBBFAC,,, | Performed by: INTERNAL MEDICINE

## 2021-12-01 PROCEDURE — 99999 PR PBB SHADOW E&M-EST. PATIENT-LVL III: CPT | Mod: PBBFAC,,, | Performed by: INTERNAL MEDICINE

## 2021-12-01 PROCEDURE — 4010F PR ACE/ARB THEARPY RXD/TAKEN: ICD-10-PCS | Mod: CPTII,S$GLB,, | Performed by: INTERNAL MEDICINE

## 2021-12-01 PROCEDURE — 4010F ACE/ARB THERAPY RXD/TAKEN: CPT | Mod: CPTII,S$GLB,, | Performed by: INTERNAL MEDICINE

## 2021-12-01 RX ORDER — LOSARTAN POTASSIUM 100 MG/1
100 TABLET ORAL DAILY
Qty: 90 TABLET | Refills: 3 | Status: SHIPPED | OUTPATIENT
Start: 2021-12-01 | End: 2022-02-16 | Stop reason: SDUPTHER

## 2021-12-01 RX ORDER — HYDROCHLOROTHIAZIDE 25 MG/1
25 TABLET ORAL DAILY
Qty: 90 TABLET | Refills: 3 | Status: SHIPPED | OUTPATIENT
Start: 2021-12-01 | End: 2023-02-16

## 2021-12-01 RX ORDER — AMLODIPINE BESYLATE 2.5 MG/1
2.5 TABLET ORAL DAILY
Qty: 90 TABLET | Refills: 3 | Status: SHIPPED | OUTPATIENT
Start: 2021-12-01 | End: 2022-01-06 | Stop reason: DRUGHIGH

## 2021-12-01 RX ORDER — ACETAMINOPHEN, DIPHENHYDRAMINE HCL, PHENYLEPHRINE HCL 325; 25; 5 MG/1; MG/1; MG/1
TABLET ORAL DAILY
COMMUNITY

## 2022-01-06 ENCOUNTER — OFFICE VISIT (OUTPATIENT)
Dept: CARDIOLOGY | Facility: CLINIC | Age: 37
End: 2022-01-06
Payer: COMMERCIAL

## 2022-01-06 VITALS
HEIGHT: 75 IN | BODY MASS INDEX: 28.68 KG/M2 | HEART RATE: 93 BPM | SYSTOLIC BLOOD PRESSURE: 147 MMHG | OXYGEN SATURATION: 98 % | WEIGHT: 230.63 LBS | DIASTOLIC BLOOD PRESSURE: 99 MMHG

## 2022-01-06 DIAGNOSIS — I10 ESSENTIAL HYPERTENSION: Primary | ICD-10-CM

## 2022-01-06 PROCEDURE — 1159F MED LIST DOCD IN RCRD: CPT | Mod: CPTII,S$GLB,, | Performed by: INTERNAL MEDICINE

## 2022-01-06 PROCEDURE — 1160F PR REVIEW ALL MEDS BY PRESCRIBER/CLIN PHARMACIST DOCUMENTED: ICD-10-PCS | Mod: CPTII,S$GLB,, | Performed by: INTERNAL MEDICINE

## 2022-01-06 PROCEDURE — 3080F PR MOST RECENT DIASTOLIC BLOOD PRESSURE >= 90 MM HG: ICD-10-PCS | Mod: CPTII,S$GLB,, | Performed by: INTERNAL MEDICINE

## 2022-01-06 PROCEDURE — 3077F PR MOST RECENT SYSTOLIC BLOOD PRESSURE >= 140 MM HG: ICD-10-PCS | Mod: CPTII,S$GLB,, | Performed by: INTERNAL MEDICINE

## 2022-01-06 PROCEDURE — 99999 PR PBB SHADOW E&M-EST. PATIENT-LVL III: ICD-10-PCS | Mod: PBBFAC,,, | Performed by: INTERNAL MEDICINE

## 2022-01-06 PROCEDURE — 3008F PR BODY MASS INDEX (BMI) DOCUMENTED: ICD-10-PCS | Mod: CPTII,S$GLB,, | Performed by: INTERNAL MEDICINE

## 2022-01-06 PROCEDURE — 1159F PR MEDICATION LIST DOCUMENTED IN MEDICAL RECORD: ICD-10-PCS | Mod: CPTII,S$GLB,, | Performed by: INTERNAL MEDICINE

## 2022-01-06 PROCEDURE — 1160F RVW MEDS BY RX/DR IN RCRD: CPT | Mod: CPTII,S$GLB,, | Performed by: INTERNAL MEDICINE

## 2022-01-06 PROCEDURE — 99213 PR OFFICE/OUTPT VISIT, EST, LEVL III, 20-29 MIN: ICD-10-PCS | Mod: S$GLB,,, | Performed by: INTERNAL MEDICINE

## 2022-01-06 PROCEDURE — 3008F BODY MASS INDEX DOCD: CPT | Mod: CPTII,S$GLB,, | Performed by: INTERNAL MEDICINE

## 2022-01-06 PROCEDURE — 99213 OFFICE O/P EST LOW 20 MIN: CPT | Mod: S$GLB,,, | Performed by: INTERNAL MEDICINE

## 2022-01-06 PROCEDURE — 99999 PR PBB SHADOW E&M-EST. PATIENT-LVL III: CPT | Mod: PBBFAC,,, | Performed by: INTERNAL MEDICINE

## 2022-01-06 PROCEDURE — 3077F SYST BP >= 140 MM HG: CPT | Mod: CPTII,S$GLB,, | Performed by: INTERNAL MEDICINE

## 2022-01-06 PROCEDURE — 3080F DIAST BP >= 90 MM HG: CPT | Mod: CPTII,S$GLB,, | Performed by: INTERNAL MEDICINE

## 2022-01-06 RX ORDER — AMLODIPINE BESYLATE 5 MG/1
5 TABLET ORAL DAILY
Qty: 90 TABLET | Refills: 3 | Status: SHIPPED | OUTPATIENT
Start: 2022-01-06 | End: 2022-12-15

## 2022-01-06 NOTE — PROGRESS NOTES
Subjective:      Patient ID: Everett Bateman is a 36 y.o. male.    Chief Complaint: Follow-up    HPI: Works as a     Review of Systems   Cardiovascular: Negative for chest pain, claudication, dyspnea on exertion, irregular heartbeat, leg swelling, near-syncope, orthopnea, palpitations and syncope.        Past Medical History:   Diagnosis Date    Addiction to drug     Anxiety     Headache     Heart murmur     Hx of psychiatric care     Hypertension     Psychiatric problem     Substance abuse     Therapy         Past Surgical History:   Procedure Laterality Date    HAND SURGERY         Family History   Problem Relation Age of Onset    Hypertension Mother     Hypertension Father     Hypertension Sister     Colon cancer Maternal Grandfather     Diabetes Mellitus Maternal Grandfather        Social History     Socioeconomic History    Marital status: Single   Tobacco Use    Smoking status: Current Every Day Smoker     Packs/day: 0.50     Years: 5.00     Pack years: 2.50     Types: Cigarettes    Smokeless tobacco: Never Used   Substance and Sexual Activity    Alcohol use: Yes     Alcohol/week: 2.0 standard drinks     Types: 2 Cans of beer per week    Drug use: Yes     Types: Heroin, Hydrocodone    Sexual activity: Yes       Current Outpatient Medications on File Prior to Visit   Medication Sig Dispense Refill    atomoxetine (STRATTERA) 25 MG capsule Take 1 capsule (25 mg total) by mouth once daily. 30 capsule 11    desonide (DESOWEN) 0.05 % cream Apply to affected area 2 times daily 60 g 1    hydroCHLOROthiazide (HYDRODIURIL) 25 MG tablet Take 1 tablet (25 mg total) by mouth once daily. 90 tablet 3    losartan (COZAAR) 100 MG tablet Take 1 tablet (100 mg total) by mouth once daily. 90 tablet 3    melatonin 10 mg Tab Take by mouth Daily.      traZODone (DESYREL) 50 MG tablet Take 1 tablet (50 mg total) by mouth every evening. 30 tablet 11    venlafaxine (EFFEXOR-XR) 75 MG 24 hr capsule  "Take 1 capsule (75 mg total) by mouth once daily. 30 capsule 11    [DISCONTINUED] amLODIPine (NORVASC) 2.5 MG tablet Take 1 tablet (2.5 mg total) by mouth once daily. 90 tablet 3    [DISCONTINUED] [id number] 250-1.25 mg Lozg by Mucous Membrane route.      [DISCONTINUED] naltrexone HCl (NALTREXONE ORAL) Take 50 mg by mouth Daily.       No current facility-administered medications on file prior to visit.       Review of patient's allergies indicates:   Allergen Reactions    Wellbutrin [bupropion hcl] Other (See Comments)     Felt suicidal     Objective:     Vitals:    01/06/22 1436   BP: (!) 147/99   BP Location: Left arm   Patient Position: Sitting   BP Method: Large (Automatic)   Pulse: 93   SpO2: 98%   Weight: 104.6 kg (230 lb 9.6 oz)   Height: 6' 3" (1.905 m)        Physical Exam  Vitals reviewed.   Constitutional:       General: He is not in acute distress.     Appearance: He is well-developed and well-nourished. He is not diaphoretic.   Eyes:      General: No scleral icterus.  Neck:      Vascular: No carotid bruit or JVD.   Cardiovascular:      Rate and Rhythm: Regular rhythm.      Heart sounds: Normal heart sounds. No murmur heard.  No friction rub. No gallop.    Pulmonary:      Effort: Pulmonary effort is normal. No respiratory distress.      Breath sounds: Normal breath sounds.   Musculoskeletal:         General: No edema.      Right lower leg: No edema.      Left lower leg: No edema.   Skin:     General: Skin is warm and dry.   Neurological:      Mental Status: He is alert and oriented to person, place, and time.   Psychiatric:         Mood and Affect: Mood and affect normal.         Behavior: Behavior normal.         Thought Content: Thought content normal.         Judgment: Judgment normal.              Wt  Up 16 lbs over the past 2 years      Lab Visit on 11/29/2021   Component Date Value Ref Range Status    WBC 11/29/2021 6.62  3.90 - 12.70 K/uL Final    RBC 11/29/2021 5.26  " 4.60 - 6.20 M/uL Final    Hemoglobin 11/29/2021 16.5  14.0 - 18.0 g/dL Final    Hematocrit 11/29/2021 45.4  40.0 - 54.0 % Final    MCV 11/29/2021 86  82 - 98 fL Final    MCH 11/29/2021 31.4* 27.0 - 31.0 pg Final    MCHC 11/29/2021 36.3* 32.0 - 36.0 g/dL Final    RDW 11/29/2021 12.0  11.5 - 14.5 % Final    Platelets 11/29/2021 274  150 - 450 K/uL Final    MPV 11/29/2021 10.3  9.2 - 12.9 fL Final    Immature Granulocytes 11/29/2021 0.3  0.0 - 0.5 % Final    Gran # (ANC) 11/29/2021 3.7  1.8 - 7.7 K/uL Final    Immature Grans (Abs) 11/29/2021 0.02  0.00 - 0.04 K/uL Final    Lymph # 11/29/2021 1.6  1.0 - 4.8 K/uL Final    Mono # 11/29/2021 0.8  0.3 - 1.0 K/uL Final    Eos # 11/29/2021 0.4  0.0 - 0.5 K/uL Final    Baso # 11/29/2021 0.07  0.00 - 0.20 K/uL Final    nRBC 11/29/2021 0  0 /100 WBC Final    Gran % 11/29/2021 56.2  38.0 - 73.0 % Final    Lymph % 11/29/2021 24.3  18.0 - 48.0 % Final    Mono % 11/29/2021 12.1  4.0 - 15.0 % Final    Eosinophil % 11/29/2021 6.0  0.0 - 8.0 % Final    Basophil % 11/29/2021 1.1  0.0 - 1.9 % Final    Differential Method 11/29/2021 Automated   Final    Sodium 11/29/2021 139  136 - 145 mmol/L Final    Potassium 11/29/2021 3.4* 3.5 - 5.1 mmol/L Final    Chloride 11/29/2021 100  95 - 110 mmol/L Final    CO2 11/29/2021 29  23 - 29 mmol/L Final    Glucose 11/29/2021 99  70 - 110 mg/dL Final    BUN 11/29/2021 17  6 - 20 mg/dL Final    Creatinine 11/29/2021 1.0  0.5 - 1.4 mg/dL Final    Calcium 11/29/2021 9.6  8.7 - 10.5 mg/dL Final    Total Protein 11/29/2021 8.1  6.0 - 8.4 g/dL Final    Albumin 11/29/2021 4.3  3.5 - 5.2 g/dL Final    Total Bilirubin 11/29/2021 1.1* 0.1 - 1.0 mg/dL Final    Alkaline Phosphatase 11/29/2021 59  55 - 135 U/L Final    AST 11/29/2021 23  10 - 40 U/L Final    ALT 11/29/2021 24  10 - 44 U/L Final    Anion Gap 11/29/2021 10  8 - 16 mmol/L Final    eGFR if African American 11/29/2021 >60  >60 mL/min/1.73 m^2 Final    eGFR if non   11/29/2021 >60  >60 mL/min/1.73 m^2 Final    TSH 11/29/2021 1.813  0.400 - 4.000 uIU/mL Final    Cholesterol 11/29/2021 210* 120 - 199 mg/dL Final    Triglycerides 11/29/2021 167* 30 - 150 mg/dL Final    HDL 11/29/2021 44  40 - 75 mg/dL Final    LDL Cholesterol 11/29/2021 132.6  63.0 - 159.0 mg/dL Final    HDL/Cholesterol Ratio 11/29/2021 21.0  20.0 - 50.0 % Final    Total Cholesterol/HDL Ratio 11/29/2021 4.8  2.0 - 5.0 Final    Non-HDL Cholesterol 11/29/2021 166  mg/dL Final   (    Assessment:     1. Essential hypertension      Plan:   Everett was seen today for follow-up.    Diagnoses and all orders for this visit:    Essential hypertension    Other orders  -     amLODIPine (NORVASC) 5 MG tablet; Take 1 tablet (5 mg total) by mouth once daily.     Same meds plus increase the amlodipine to 5 mg daily    RTC one month    If not at target will double the amlodipine again    May need to add eplerenone    No follow-ups on file.

## 2022-02-14 ENCOUNTER — OFFICE VISIT (OUTPATIENT)
Dept: SLEEP MEDICINE | Facility: CLINIC | Age: 37
End: 2022-02-14
Payer: COMMERCIAL

## 2022-02-14 VITALS
DIASTOLIC BLOOD PRESSURE: 103 MMHG | HEART RATE: 112 BPM | WEIGHT: 230.38 LBS | BODY MASS INDEX: 28.8 KG/M2 | SYSTOLIC BLOOD PRESSURE: 150 MMHG

## 2022-02-14 DIAGNOSIS — F51.09 OTHER INSOMNIA NOT DUE TO A SUBSTANCE OR KNOWN PHYSIOLOGICAL CONDITION: ICD-10-CM

## 2022-02-14 DIAGNOSIS — R06.83 SNORING: ICD-10-CM

## 2022-02-14 DIAGNOSIS — G47.10 HYPERSOMNOLENCE: Primary | ICD-10-CM

## 2022-02-14 PROCEDURE — 3080F DIAST BP >= 90 MM HG: CPT | Mod: CPTII,S$GLB,, | Performed by: INTERNAL MEDICINE

## 2022-02-14 PROCEDURE — 3077F PR MOST RECENT SYSTOLIC BLOOD PRESSURE >= 140 MM HG: ICD-10-PCS | Mod: CPTII,S$GLB,, | Performed by: INTERNAL MEDICINE

## 2022-02-14 PROCEDURE — 4010F ACE/ARB THERAPY RXD/TAKEN: CPT | Mod: CPTII,S$GLB,, | Performed by: INTERNAL MEDICINE

## 2022-02-14 PROCEDURE — 3080F PR MOST RECENT DIASTOLIC BLOOD PRESSURE >= 90 MM HG: ICD-10-PCS | Mod: CPTII,S$GLB,, | Performed by: INTERNAL MEDICINE

## 2022-02-14 PROCEDURE — 99204 OFFICE O/P NEW MOD 45 MIN: CPT | Mod: S$GLB,,, | Performed by: INTERNAL MEDICINE

## 2022-02-14 PROCEDURE — 1159F PR MEDICATION LIST DOCUMENTED IN MEDICAL RECORD: ICD-10-PCS | Mod: CPTII,S$GLB,, | Performed by: INTERNAL MEDICINE

## 2022-02-14 PROCEDURE — 99999 PR PBB SHADOW E&M-EST. PATIENT-LVL III: ICD-10-PCS | Mod: PBBFAC,,, | Performed by: INTERNAL MEDICINE

## 2022-02-14 PROCEDURE — 3077F SYST BP >= 140 MM HG: CPT | Mod: CPTII,S$GLB,, | Performed by: INTERNAL MEDICINE

## 2022-02-14 PROCEDURE — 3008F PR BODY MASS INDEX (BMI) DOCUMENTED: ICD-10-PCS | Mod: CPTII,S$GLB,, | Performed by: INTERNAL MEDICINE

## 2022-02-14 PROCEDURE — 3008F BODY MASS INDEX DOCD: CPT | Mod: CPTII,S$GLB,, | Performed by: INTERNAL MEDICINE

## 2022-02-14 PROCEDURE — 99999 PR PBB SHADOW E&M-EST. PATIENT-LVL III: CPT | Mod: PBBFAC,,, | Performed by: INTERNAL MEDICINE

## 2022-02-14 PROCEDURE — 99204 PR OFFICE/OUTPT VISIT, NEW, LEVL IV, 45-59 MIN: ICD-10-PCS | Mod: S$GLB,,, | Performed by: INTERNAL MEDICINE

## 2022-02-14 PROCEDURE — 1159F MED LIST DOCD IN RCRD: CPT | Mod: CPTII,S$GLB,, | Performed by: INTERNAL MEDICINE

## 2022-02-14 PROCEDURE — 4010F PR ACE/ARB THEARPY RXD/TAKEN: ICD-10-PCS | Mod: CPTII,S$GLB,, | Performed by: INTERNAL MEDICINE

## 2022-02-14 NOTE — PROGRESS NOTES
Referred by Self, Aaareferral     NEW PATIENT VISIT    Everett Bateman  is a pleasant 36 y.o. male  with PMH significant for HTN who presents today for evaluation of snoring, daytime sleepiness, witnessed apneas.    SLEEP SCHEDULE   Bed Time 8:30PM (M-th), [place]   Sleep Latency hour   Arousals 2-4   Nocturia 0-2   Back to sleep 5-15 min   Wake time 2:45 AM ( 7-8AM)   Naps 1-3 hours after work   Work      Vitals:    02/14/22 1336   BP: (!) 150/103   BP Location: Left arm   Patient Position: Sitting   BP Method: Medium (Automatic)   Pulse: (!) 112   Weight: 104.5 kg (230 lb 6.1 oz)     Physical Exam:    GEN:   Well-appearing  Psych:  Appropriate affect, demonstrates insight  SKIN:  No rash on the face or bridge of the nose    LABS:   Lab Results   Component Value Date    HGB 16.5 11/29/2021    CO2 29 11/29/2021       RECORDS REVIEWED PREVIOUSLY:    No prior sleep testing.    ASSESSMENT    No flowsheet data found.  PROBLEM DESCRIPTION/ Sx on Presentation  STATUS   suspected NICOLAS   + snoring, + witnessed apneas  HEENT: MP1, + oropharynx shallow in A-P dimension  New   Daytime Sx   + sleepiness when inactive (dozes off in the afternoon)  denies sleepiness when driving   ESS 12/24 on intake  New   ADHD   On strattera in the past, no longer  New   Other issues: HTN    PLAN     -will proceed with sleep testing   -discussed trial of PAP therapy if NICOLAS present   -driving precautions were discussed with the patient    RTC          The patient was given open opportunity to ask questions and/or express concerns about treatment plan.   All questions/concerns were discussed.     Two patient identifiers used prior to evaluation.

## 2022-02-16 ENCOUNTER — OFFICE VISIT (OUTPATIENT)
Dept: CARDIOLOGY | Facility: CLINIC | Age: 37
End: 2022-02-16
Payer: COMMERCIAL

## 2022-02-16 VITALS
WEIGHT: 233.81 LBS | DIASTOLIC BLOOD PRESSURE: 74 MMHG | BODY MASS INDEX: 29.07 KG/M2 | OXYGEN SATURATION: 98 % | HEIGHT: 75 IN | HEART RATE: 92 BPM | SYSTOLIC BLOOD PRESSURE: 128 MMHG

## 2022-02-16 DIAGNOSIS — I10 ESSENTIAL HYPERTENSION: Primary | ICD-10-CM

## 2022-02-16 DIAGNOSIS — E87.6 HYPOKALEMIA: ICD-10-CM

## 2022-02-16 PROCEDURE — 3074F SYST BP LT 130 MM HG: CPT | Mod: CPTII,S$GLB,, | Performed by: INTERNAL MEDICINE

## 2022-02-16 PROCEDURE — 1159F MED LIST DOCD IN RCRD: CPT | Mod: CPTII,S$GLB,, | Performed by: INTERNAL MEDICINE

## 2022-02-16 PROCEDURE — 99213 PR OFFICE/OUTPT VISIT, EST, LEVL III, 20-29 MIN: ICD-10-PCS | Mod: S$GLB,,, | Performed by: INTERNAL MEDICINE

## 2022-02-16 PROCEDURE — 1160F PR REVIEW ALL MEDS BY PRESCRIBER/CLIN PHARMACIST DOCUMENTED: ICD-10-PCS | Mod: CPTII,S$GLB,, | Performed by: INTERNAL MEDICINE

## 2022-02-16 PROCEDURE — 4010F ACE/ARB THERAPY RXD/TAKEN: CPT | Mod: CPTII,S$GLB,, | Performed by: INTERNAL MEDICINE

## 2022-02-16 PROCEDURE — 3074F PR MOST RECENT SYSTOLIC BLOOD PRESSURE < 130 MM HG: ICD-10-PCS | Mod: CPTII,S$GLB,, | Performed by: INTERNAL MEDICINE

## 2022-02-16 PROCEDURE — 1160F RVW MEDS BY RX/DR IN RCRD: CPT | Mod: CPTII,S$GLB,, | Performed by: INTERNAL MEDICINE

## 2022-02-16 PROCEDURE — 4010F PR ACE/ARB THEARPY RXD/TAKEN: ICD-10-PCS | Mod: CPTII,S$GLB,, | Performed by: INTERNAL MEDICINE

## 2022-02-16 PROCEDURE — 3008F PR BODY MASS INDEX (BMI) DOCUMENTED: ICD-10-PCS | Mod: CPTII,S$GLB,, | Performed by: INTERNAL MEDICINE

## 2022-02-16 PROCEDURE — 3008F BODY MASS INDEX DOCD: CPT | Mod: CPTII,S$GLB,, | Performed by: INTERNAL MEDICINE

## 2022-02-16 PROCEDURE — 99999 PR PBB SHADOW E&M-EST. PATIENT-LVL III: ICD-10-PCS | Mod: PBBFAC,,, | Performed by: INTERNAL MEDICINE

## 2022-02-16 PROCEDURE — 3078F DIAST BP <80 MM HG: CPT | Mod: CPTII,S$GLB,, | Performed by: INTERNAL MEDICINE

## 2022-02-16 PROCEDURE — 1159F PR MEDICATION LIST DOCUMENTED IN MEDICAL RECORD: ICD-10-PCS | Mod: CPTII,S$GLB,, | Performed by: INTERNAL MEDICINE

## 2022-02-16 PROCEDURE — 99999 PR PBB SHADOW E&M-EST. PATIENT-LVL III: CPT | Mod: PBBFAC,,, | Performed by: INTERNAL MEDICINE

## 2022-02-16 PROCEDURE — 3078F PR MOST RECENT DIASTOLIC BLOOD PRESSURE < 80 MM HG: ICD-10-PCS | Mod: CPTII,S$GLB,, | Performed by: INTERNAL MEDICINE

## 2022-02-16 PROCEDURE — 99213 OFFICE O/P EST LOW 20 MIN: CPT | Mod: S$GLB,,, | Performed by: INTERNAL MEDICINE

## 2022-02-16 RX ORDER — LOSARTAN POTASSIUM 100 MG/1
100 TABLET ORAL DAILY
Qty: 90 TABLET | Refills: 3 | Status: SHIPPED | OUTPATIENT
Start: 2022-02-16 | End: 2023-03-21

## 2022-02-16 NOTE — PROGRESS NOTES
Subjective:      Patient ID: Everett Bateman is a 36 y.o. male.    Chief Complaint: Follow-up    HPI:  Feels fine.    Girlfriend reports snoring which stops pt from breathing.    Pt is scheduled for a sleep study.    Review of Systems   Cardiovascular: Negative for chest pain, claudication, dyspnea on exertion, irregular heartbeat, leg swelling, near-syncope, orthopnea, palpitations and syncope.        Past Medical History:   Diagnosis Date    Addiction to drug     Anxiety     Headache     Heart murmur     Hx of psychiatric care     Hypertension     Psychiatric problem     Substance abuse     Therapy         Past Surgical History:   Procedure Laterality Date    HAND SURGERY         Family History   Problem Relation Age of Onset    Hypertension Mother     Hypertension Father     Hypertension Sister     Colon cancer Maternal Grandfather     Diabetes Mellitus Maternal Grandfather        Social History     Socioeconomic History    Marital status: Single   Tobacco Use    Smoking status: Current Every Day Smoker     Packs/day: 0.50     Years: 5.00     Pack years: 2.50     Types: Cigarettes    Smokeless tobacco: Never Used   Substance and Sexual Activity    Alcohol use: Yes     Alcohol/week: 2.0 standard drinks     Types: 2 Cans of beer per week    Drug use: Yes     Types: Heroin, Hydrocodone    Sexual activity: Yes       Current Outpatient Medications on File Prior to Visit   Medication Sig Dispense Refill    amLODIPine (NORVASC) 5 MG tablet Take 1 tablet (5 mg total) by mouth once daily. 90 tablet 3    desonide (DESOWEN) 0.05 % cream Apply to affected area 2 times daily 60 g 1    hydroCHLOROthiazide (HYDRODIURIL) 25 MG tablet Take 1 tablet (25 mg total) by mouth once daily. 90 tablet 3    melatonin 10 mg Tab Take by mouth Daily.      venlafaxine (EFFEXOR-XR) 75 MG 24 hr capsule Take 1 capsule (75 mg total) by mouth once daily. 30 capsule 11    [DISCONTINUED] losartan (COZAAR) 100 MG tablet Take 1  "tablet (100 mg total) by mouth once daily. 90 tablet 3    atomoxetine (STRATTERA) 25 MG capsule Take 1 capsule (25 mg total) by mouth once daily. (Patient not taking: Reported on 2/16/2022) 30 capsule 11    traZODone (DESYREL) 50 MG tablet Take 1 tablet (50 mg total) by mouth every evening. (Patient not taking: Reported on 2/16/2022) 30 tablet 11     No current facility-administered medications on file prior to visit.       Review of patient's allergies indicates:   Allergen Reactions    Wellbutrin [bupropion hcl] Other (See Comments)     Felt suicidal     Objective:     Vitals:    02/16/22 1501 02/16/22 1530   BP: (!) 146/100 128/74   BP Location: Right arm Right arm   Patient Position: Sitting Sitting   BP Method: Large (Automatic)    Pulse: 92    SpO2: 98%    Weight: 106.1 kg (233 lb 12.8 oz)    Height: 6' 3" (1.905 m)         Physical Exam  Vitals reviewed.   Constitutional:       General: He is not in acute distress.     Appearance: He is well-developed and well-nourished. He is not diaphoretic.   Eyes:      General: No scleral icterus.  Neck:      Vascular: No carotid bruit or JVD.   Cardiovascular:      Rate and Rhythm: Regular rhythm.      Heart sounds: Normal heart sounds. No murmur heard.  No friction rub. No gallop.    Pulmonary:      Effort: Pulmonary effort is normal. No respiratory distress.      Breath sounds: Normal breath sounds.   Musculoskeletal:         General: No edema.      Right lower leg: No edema.      Left lower leg: No edema.   Skin:     General: Skin is warm and dry.   Neurological:      Mental Status: He is alert and oriented to person, place, and time.   Psychiatric:         Mood and Affect: Mood and affect normal.         Behavior: Behavior normal.         Thought Content: Thought content normal.         Judgment: Judgment normal.        Lab Visit on 11/29/2021   Component Date Value Ref Range Status    WBC 11/29/2021 6.62  3.90 - 12.70 K/uL Final    RBC 11/29/2021 5.26  4.60 - " 6.20 M/uL Final    Hemoglobin 11/29/2021 16.5  14.0 - 18.0 g/dL Final    Hematocrit 11/29/2021 45.4  40.0 - 54.0 % Final    MCV 11/29/2021 86  82 - 98 fL Final    MCH 11/29/2021 31.4* 27.0 - 31.0 pg Final    MCHC 11/29/2021 36.3* 32.0 - 36.0 g/dL Final    RDW 11/29/2021 12.0  11.5 - 14.5 % Final    Platelets 11/29/2021 274  150 - 450 K/uL Final    MPV 11/29/2021 10.3  9.2 - 12.9 fL Final    Immature Granulocytes 11/29/2021 0.3  0.0 - 0.5 % Final    Gran # (ANC) 11/29/2021 3.7  1.8 - 7.7 K/uL Final    Immature Grans (Abs) 11/29/2021 0.02  0.00 - 0.04 K/uL Final    Lymph # 11/29/2021 1.6  1.0 - 4.8 K/uL Final    Mono # 11/29/2021 0.8  0.3 - 1.0 K/uL Final    Eos # 11/29/2021 0.4  0.0 - 0.5 K/uL Final    Baso # 11/29/2021 0.07  0.00 - 0.20 K/uL Final    nRBC 11/29/2021 0  0 /100 WBC Final    Gran % 11/29/2021 56.2  38.0 - 73.0 % Final    Lymph % 11/29/2021 24.3  18.0 - 48.0 % Final    Mono % 11/29/2021 12.1  4.0 - 15.0 % Final    Eosinophil % 11/29/2021 6.0  0.0 - 8.0 % Final    Basophil % 11/29/2021 1.1  0.0 - 1.9 % Final    Differential Method 11/29/2021 Automated   Final    Sodium 11/29/2021 139  136 - 145 mmol/L Final    Potassium 11/29/2021 3.4* 3.5 - 5.1 mmol/L Final    Chloride 11/29/2021 100  95 - 110 mmol/L Final    CO2 11/29/2021 29  23 - 29 mmol/L Final    Glucose 11/29/2021 99  70 - 110 mg/dL Final    BUN 11/29/2021 17  6 - 20 mg/dL Final    Creatinine 11/29/2021 1.0  0.5 - 1.4 mg/dL Final    Calcium 11/29/2021 9.6  8.7 - 10.5 mg/dL Final    Total Protein 11/29/2021 8.1  6.0 - 8.4 g/dL Final    Albumin 11/29/2021 4.3  3.5 - 5.2 g/dL Final    Total Bilirubin 11/29/2021 1.1* 0.1 - 1.0 mg/dL Final    Alkaline Phosphatase 11/29/2021 59  55 - 135 U/L Final    AST 11/29/2021 23  10 - 40 U/L Final    ALT 11/29/2021 24  10 - 44 U/L Final    Anion Gap 11/29/2021 10  8 - 16 mmol/L Final    eGFR if African American 11/29/2021 >60  >60 mL/min/1.73 m^2 Final    eGFR if non African  American 11/29/2021 >60  >60 mL/min/1.73 m^2 Final    TSH 11/29/2021 1.813  0.400 - 4.000 uIU/mL Final    Cholesterol 11/29/2021 210* 120 - 199 mg/dL Final    Triglycerides 11/29/2021 167* 30 - 150 mg/dL Final    HDL 11/29/2021 44  40 - 75 mg/dL Final    LDL Cholesterol 11/29/2021 132.6  63.0 - 159.0 mg/dL Final    HDL/Cholesterol Ratio 11/29/2021 21.0  20.0 - 50.0 % Final    Total Cholesterol/HDL Ratio 11/29/2021 4.8  2.0 - 5.0 Final    Non-HDL Cholesterol 11/29/2021 166  mg/dL Final   (      Assessment:     1. Essential hypertension      Plan:   Everett was seen today for follow-up.    Diagnoses and all orders for this visit:    Essential hypertension    Other orders  -     losartan (COZAAR) 100 MG tablet; Take 1 tablet (100 mg total) by mouth once daily.     HBP is well controlled on current regimen    Pt is having no side effects from meds    Same meds    Cessation of all tobacco products counseled    Repeat K level    Eat bananas and oranges     New PCP    RTC 6 months    No follow-ups on file.

## 2022-02-24 ENCOUNTER — HOSPITAL ENCOUNTER (OUTPATIENT)
Dept: SLEEP MEDICINE | Facility: HOSPITAL | Age: 37
Discharge: HOME | End: 2022-02-24
Attending: INTERNAL MEDICINE
Payer: COMMERCIAL

## 2022-02-24 DIAGNOSIS — G47.10 HYPERSOMNOLENCE: ICD-10-CM

## 2022-02-24 DIAGNOSIS — F51.09 OTHER INSOMNIA NOT DUE TO A SUBSTANCE OR KNOWN PHYSIOLOGICAL CONDITION: ICD-10-CM

## 2022-02-24 DIAGNOSIS — R06.83 SNORING: ICD-10-CM

## 2022-02-24 PROCEDURE — 95800 SLP STDY UNATTENDED: CPT

## 2022-02-24 NOTE — PROGRESS NOTES
The patient ID was verified. He  was instructed on how to turn the Home Sleep Testing device on and off, how to apply the sensors. He  was encouraged to sleep on supine position and must have 6 hours of sleep. The patient was instructed not to get the device wet and return it back to us. All questions were answered prior to patient leaving. He was provided the after visit summary.

## 2022-02-25 ENCOUNTER — TELEPHONE (OUTPATIENT)
Dept: SLEEP MEDICINE | Facility: HOSPITAL | Age: 37
End: 2022-02-25
Payer: COMMERCIAL

## 2022-02-25 NOTE — PROGRESS NOTES
HST was returned today and the patient reported error 6 during the night. It was uploaded and it showed less than 1 hour of recorded sleep data consistent with the error code. The patient was informed and will return on 3/14 or sooner for repeat study.

## 2022-03-11 ENCOUNTER — TELEPHONE (OUTPATIENT)
Dept: SLEEP MEDICINE | Facility: HOSPITAL | Age: 37
End: 2022-03-11
Payer: COMMERCIAL

## 2022-03-14 PROCEDURE — 95806 SLEEP STUDY UNATT&RESP EFFT: CPT | Mod: 26,,, | Performed by: INTERNAL MEDICINE

## 2022-03-14 PROCEDURE — 95806 PR SLEEP STUDY, UNATTENDED, SIMUL RECORD HR/O2 SAT/RESP FLOW/RESP EFFT: ICD-10-PCS | Mod: 26,,, | Performed by: INTERNAL MEDICINE

## 2022-03-15 NOTE — PROGRESS NOTES
Documentation entered on 3/15/22:  The HSAT device was received and uploaded this morning. The recorded sleep data noted to be adequate and the patient did not report issue with the device during the study. This was a repeat study since the first try patient encountered error 6 code.

## 2022-03-17 DIAGNOSIS — G47.33 SEVERE OBSTRUCTIVE SLEEP APNEA: Primary | ICD-10-CM

## 2022-03-17 DIAGNOSIS — G47.10 HYPERSOMNOLENCE: ICD-10-CM

## 2022-03-28 ENCOUNTER — TELEPHONE (OUTPATIENT)
Dept: SLEEP MEDICINE | Facility: HOSPITAL | Age: 37
End: 2022-03-28
Payer: COMMERCIAL

## 2022-04-01 ENCOUNTER — HOSPITAL ENCOUNTER (OUTPATIENT)
Dept: SLEEP MEDICINE | Facility: HOSPITAL | Age: 37
Discharge: HOME | End: 2022-04-01
Attending: INTERNAL MEDICINE
Payer: COMMERCIAL

## 2022-04-01 DIAGNOSIS — G47.10 HYPERSOMNOLENCE: ICD-10-CM

## 2022-04-01 DIAGNOSIS — G47.33 SEVERE OBSTRUCTIVE SLEEP APNEA: ICD-10-CM

## 2022-04-01 PROCEDURE — 95811 POLYSOM 6/>YRS CPAP 4/> PARM: CPT

## 2022-04-01 PROCEDURE — 95811 POLYSOM 6/>YRS CPAP 4/> PARM: CPT | Mod: 26,,, | Performed by: INTERNAL MEDICINE

## 2022-04-01 PROCEDURE — 95811 PR POLYSOMNOGRAPHY W/CPAP: ICD-10-PCS | Mod: 26,,, | Performed by: INTERNAL MEDICINE

## 2022-04-02 NOTE — PATIENT INSTRUCTIONS
Your sleep study will be scored and interpreted by one of our physicians who are board certified in sleep medicine.  Within two weeks the results will be sent to the physician who referred you. Your physician should then contact you to go over the results, along with any recommendations. If you do not hear from your physician within two weeks, please call them.

## 2022-04-02 NOTE — PROGRESS NOTES
End of the night summary  Type of study performed on (Everett Bateman-) CPAP  Patient education and cpap information done prior to sleep study  Pt was informed, of emergency cord in bathroom and given spectra link phone#  EKG Appears to be-  NSR w PAcs    Low spo2 - 85%    Any difficulties recording:  None    Optimal pressure: 12 or 13    MASK: Simplus MED    Pt reaction to CPAP: pt reports it was comfortable able to use it for the study,     Tech summary Comments:    pt observed titrated on cpap, pt tolerated cpap pressures well, optimal pressure observed pt sleep supine most of the night, pt has frequent leg movements observed, pt slept okay no reprots of discomfort

## 2022-04-04 ENCOUNTER — TELEPHONE (OUTPATIENT)
Dept: SLEEP MEDICINE | Facility: CLINIC | Age: 37
End: 2022-04-04
Payer: COMMERCIAL

## 2022-04-04 NOTE — TELEPHONE ENCOUNTER
Spoke to the pt and informed him that it will be 2 weeks before he will hear from Dr. Leonadr about his result. Staff explained that once his results get in the system that the provider will reached out to him

## 2022-04-13 ENCOUNTER — PATIENT MESSAGE (OUTPATIENT)
Dept: SLEEP MEDICINE | Facility: CLINIC | Age: 37
End: 2022-04-13
Payer: COMMERCIAL

## 2022-04-13 NOTE — PROCEDURES
"Titration PSG Report  [facility]  [address] [place] [address]  Phone: [phone]  Fax: [phone]               Patient Name: Everett Bateman Study Date: 4/1/2022   YOB: 1985 Patient MRN: [id number]   Age:  36 year Hospital #: [id number]   Sex: Female Referring Physician: Jluis Leonard   Height: 6' 3" Recording Tech: Jacquie Guaman RPSGT   Weight: 233.0 lbs Scoring Tech: INEZ Head RRT, RPSGT   BMI:  29.3 AASM  1A   AHI: 5.9 Interpreting Physician       RERA index: - Low oxygen saturation: 84.0%   RDI:                Sleep architecture: This is a CPAP titration study. At lights out, the patient fell asleep in 20.5 minutes. Sleep efficiency was 93.7%. Total sleep time (TST) was 478.0 minutes. REM latency was 134.0 minutes.    Motor movement / Parasomnia: There were no significant  limb movements of sleep noted. The total limb movement index was - (- with arousal).     Cardiac: single lead EKG revealed normal sinus rhythm    CPAP titration:  The mask used in the study was Simplus FFM size medium  The first significant improvement in sleep quality was first seen at 9cwp in supine N3 sleep.   CPAP = 9 cwp was largely effective in supine REM sleep, though breakthrough obstructive events were present.  CPAP = 13 cwp was effective in supine REM.    Oxygenation:  At therapeutic levels of PAP therapy, there was no baseline hypoxemia.    Impression:  -Obstructive sleep apnea     Recommendations:    -auto-CPAP with CPAP min = 9cwp  and CPAP max =  15 is recommended  -the patient has follow up with Sleep Medicine        Jluis Leonard MD    (This Sleep Study was interpreted by a Board Certified Sleep Specialist who conducted an epoch-by-epoch review of the entire raw data recording.)  (The indication for this sleep study was reviewed and deemed appropriate by AASM Practice Parameters or other reasons by a Board Certified Sleep Specialist.)          Study " Overview    First Lights Off: 08:40:36 PM  COUNT INDEX   Last Lights On: 05:10:51 AM Awakenings: 11 1.4   Time in Bed: 510.3 Arousals: 53 6.7   Total Sleep Time: 478.0 Apneas & Hypopneas: 47 5.9   Sleep Efficiency: 93.7% Limb Movements: - -   Sleep Period Time: 484.0 Snores: - -   Sleep Maintenance Efficiency: 98.8% Desaturations: 48 6.0   Sleep Latency: 20.5      REM Latency from Sleep Onset: 134.0 Minimum Oxygen Saturation:  84.0%      Sleep Architecture                   % of Time in Bed  Stages TIME (mins) % SLEEP TIME   WAKE 33.0    Stage N1 32.0 6.7%   Stage N2 222.0 46.4%   Stage N3 52.0 10.9%   .0 36.0%     Arousal Summary     NREM REM Total Sleep Time   Apnea & Hypopnea Arousals 18 - 18   PLM Arousals - - -   Isolated Limb Movement Arousals - - -   Spontaneous Arousals 27 7 34   RERAs  - - -   Total 46 7 53   Arousal Index 9.0 2.4 6.7       Respiratory Summary     By Sleep Stage By Body Position TOTAL    NREM REM SUPINE NON-SUPINE    Time (min) 306.0 172.0 478.0 - 478.0           Obstructive Apnea - - - - -   Mixed Apnea - - - - -   Central Apnea 15 3 18 - 18   Central Apnea Index     2.3   Total Apneas 15 3 18 - 18   Total Apnea Index 2.9 1.0 2.3 - 2.3           Hypopneas w/Arousals 14 - 14 - 14           RERAs - - - - -   RERA Index - - - - -           AHI     5.9     Respiratory Event Durations     Apnea Hypopnea    NREM REM NREM REM   Average (seconds) 23.8 22.3 33.9 19.8   Maximum (seconds) 31.6 23.2 59.9 19.8     Oxygen Saturation Summary     WAKE NREM REM   Average OSat (%) 96.9% 96.1% 97.2%   Minimum OSat (%) 85.0% 84.0% 93.0%   Maximum OSat (%) 100.0% 100.0% 100.0%                            Oxygen Saturation Distribution    Range(%) Time in range (min) Time in range (%)    90.0 - 100.0 501.6 98.5%   80.0 - 90.0 5.8 1.1%   70.0 - 80.0 - -   60.0 - 70.0 - -   50.0 - 60.0 - -   0.0 - 50.0 - -                Time Spent Less than 88% OSat    Range(%) Time in range (min) Time in range (%)   0.0 -  88.0 3.0 0.6%     # of Desaturations 48   Minimum Oxygen Saturation During Desaturation 85.0%?      SUPINE LEFT RIGHT PRONE   Minimum Oxygen Saturation During Desaturation by BP 85.0%? - - -     Limb Movement Summary      COUNT INDEX   Isolated Limb Movements - -   Periodic Limb Movements (PLMs) - -   Total Limb Movements - -     Cardiac Summary     WAKE NREM REM Sleep TOTAL    Average Pulse Rate (BPM) 59.7 56.1 59.6 57.4 57.5   Minimum Pulse Rate (BPM) 51.0 48.0 48.0 48.0 48.0   Maximum Pulse Rate (BPM) 89.0 89.0 87.0 89.0 89.0     Pulse Rate Distribution    Range(bpm) Time in range (min) Time in range (%)   0.0 - 40.0 - -   40.0 - 60.0 400.2 78.5%   60.0 - 80.0 107.9 21.2%   80.0 - 100.0 1.4 0.3%   100.0 - 120.0 - -   120.0 - 140.0 - -   140.0 - 200.0 - -       Titration Summary      PAP Device PAP Level Time (min) Wake (min) NREM (min) REM (min) Sleep Eff% OA# CA# MA# Hyp# AHI RERA RDI Min OSat LM# LM Index AR# AR Index O2 Level   CPAP 5 61.0 22.5 38.5 0.0 63.1% - 7 - 8 23.4 - 23.4 88.0 - - 26 40.5 -   CPAP 6 6.5 0.0 6.5 0.0 100.0% - - - 2 18.5 - 18.5 92.0 - - - - -   CPAP 7 8.5 0.0 8.5 0.0 100.0% - - - 2 14.1 - 14.1 85.0 - - 3 21.2 -   CPAP 8 15.5 0.0 15.5 0.0 100.0% - - - - - - - 93.0 - - - - -   CPAP 9 74.5 0.0 63.5 11.0 100.0% - 1 - 1 1.6 - 1.6 93.0 - - 1 0.8 -   CPAP 10 45.0 1.0 10.0 34.0 97.8% - 2 - 5 9.5 - 9.5 86.0 - - 6 8.2 -   CPAP 11 53.5 0.5 53.0 0.0 99.1% - 3 - 8 12.5 - 12.5 84.0 - - 9 10.2 -   CPAP 12 147.0 1.0 81.5 64.5 99.3% - 3 - 2 2.1 - 2.1 92.0 - - 3 1.2 -   CPAP 13 61.0 1.0 25.0 35.0 98.4% - 2 - 1 3.0 - 3.0 93.0 - - 2 2.0 -   CPAP 14 38.5 7.0 4.0 27.5 81.8% - - - - - - - 94.0 - - 3 5.7 -

## 2022-04-25 ENCOUNTER — TELEPHONE (OUTPATIENT)
Dept: SLEEP MEDICINE | Facility: CLINIC | Age: 37
End: 2022-04-25
Payer: COMMERCIAL

## 2022-04-25 NOTE — TELEPHONE ENCOUNTER
Staff reached out to the pt and left a message to call back to get his results of his sleep study, Also he can see them on his [email or web address] portal

## 2022-04-25 NOTE — TELEPHONE ENCOUNTER
Spoke to the pt and informed him about his sleep study and also explained to him that [facility] will be contacting him about his CPAP machine

## 2022-05-10 ENCOUNTER — TELEPHONE (OUTPATIENT)
Dept: NEUROLOGY | Facility: CLINIC | Age: 37
End: 2022-05-10
Payer: COMMERCIAL

## 2022-05-10 NOTE — TELEPHONE ENCOUNTER
----- Message from Kathrinnyla Duffy sent at 5/9/2022  5:00 PM CDT -----  Contact: SLY HOFF [[id number]] [phone]  Type:  Sooner Appointment Request    Caller is requesting a sooner appointment.  Caller declined first available appointment listed below.  Caller will not accept being placed on the waitlist and is requesting a message be sent to doctor.    Name of Caller: SLY HOFF [[id number]]  When is the first available appointment? 9/1/22  Reason for Visit: Numbness, pain, muscle twitching in neck/shoulder area and in fingers  Would the patient rather a call back or a response via [facility]? Phone call   Best Call Back Number: [phone]  Additional Information:  New patient.

## 2022-05-13 ENCOUNTER — OFFICE VISIT (OUTPATIENT)
Dept: NEUROLOGY | Facility: CLINIC | Age: 37
End: 2022-05-13
Payer: COMMERCIAL

## 2022-05-13 DIAGNOSIS — R20.0 NUMBNESS AND TINGLING IN LEFT HAND: ICD-10-CM

## 2022-05-13 DIAGNOSIS — M54.2 NECK PAIN: Primary | ICD-10-CM

## 2022-05-13 DIAGNOSIS — M54.12 CERVICAL RADICULOPATHY: ICD-10-CM

## 2022-05-13 DIAGNOSIS — R20.2 HAND PARESTHESIA: Primary | ICD-10-CM

## 2022-05-13 DIAGNOSIS — R20.2 NUMBNESS AND TINGLING IN LEFT HAND: ICD-10-CM

## 2022-05-13 DIAGNOSIS — R29.898 LEFT ARM WEAKNESS: ICD-10-CM

## 2022-05-13 PROCEDURE — 1160F RVW MEDS BY RX/DR IN RCRD: CPT | Mod: CPTII,95,, | Performed by: PSYCHIATRY & NEUROLOGY

## 2022-05-13 PROCEDURE — 4010F ACE/ARB THERAPY RXD/TAKEN: CPT | Mod: CPTII,95,, | Performed by: PSYCHIATRY & NEUROLOGY

## 2022-05-13 PROCEDURE — 1159F MED LIST DOCD IN RCRD: CPT | Mod: CPTII,95,, | Performed by: PSYCHIATRY & NEUROLOGY

## 2022-05-13 PROCEDURE — 1159F PR MEDICATION LIST DOCUMENTED IN MEDICAL RECORD: ICD-10-PCS | Mod: CPTII,95,, | Performed by: PSYCHIATRY & NEUROLOGY

## 2022-05-13 PROCEDURE — 1160F PR REVIEW ALL MEDS BY PRESCRIBER/CLIN PHARMACIST DOCUMENTED: ICD-10-PCS | Mod: CPTII,95,, | Performed by: PSYCHIATRY & NEUROLOGY

## 2022-05-13 PROCEDURE — 99203 PR OFFICE/OUTPT VISIT, NEW, LEVL III, 30-44 MIN: ICD-10-PCS | Mod: 95,,, | Performed by: PSYCHIATRY & NEUROLOGY

## 2022-05-13 PROCEDURE — 4010F PR ACE/ARB THEARPY RXD/TAKEN: ICD-10-PCS | Mod: CPTII,95,, | Performed by: PSYCHIATRY & NEUROLOGY

## 2022-05-13 PROCEDURE — 99203 OFFICE O/P NEW LOW 30 MIN: CPT | Mod: 95,,, | Performed by: PSYCHIATRY & NEUROLOGY

## 2022-05-13 RX ORDER — NAPROXEN 500 MG/1
500 TABLET ORAL 2 TIMES DAILY PRN
Qty: 14 TABLET | Refills: 0 | Status: SHIPPED | OUTPATIENT
Start: 2022-05-13 | End: 2022-05-20

## 2022-05-13 RX ORDER — ORPHENADRINE CITRATE 100 MG/1
100 TABLET, EXTENDED RELEASE ORAL 2 TIMES DAILY PRN
Qty: 14 TABLET | Refills: 0 | Status: SHIPPED | OUTPATIENT
Start: 2022-05-13 | End: 2022-05-20

## 2022-05-13 NOTE — PROGRESS NOTES
Neurology Telemedicine Note     Name: Everett Bateman  MRN: [id number]   CSN: [id number]      Date: 05/13/2022    The patient location is: Home  The chief complaint leading to consultation is: Neck discomfort with LUE weakness  Visit type: Virtual visit with synchronous audio and video    TOTAL TIME SPENT: 38    Each patient to whom I provide medical services by telemedicine is:  (1) informed of the relationship between the physician and patient and the respective role of any other health care provider with respect to management of the patient; and (2) notified that they may decline to receive medical services by telemedicine and may withdraw from such care at any time.    History of Present Illness (HPI): Patient is a 36 yr old male with PMHx of HTN, anxiety/depression who presents to neurology via tele-neurology for numbness with paresthesia in his LUE in thumb, index and middle fingers for the past 1 week. This is associated with discomfort in his neck. Neck and shoulder has been aching for the past 2 weeks. Patient denies any overt motor weakness but does endorse weakness in his LUE when he has his arms over his head. Has taken aleve and BC powder and it does not help with discomfort. Patient denies any leg weakness or pain.     Nonmotor/Premotor ROS: as per HPI, and all other systems are negative    Past Medical History: The patient  has a past medical history of Addiction to drug, Anxiety, Headache, Heart murmur, psychiatric care, Hypertension, Psychiatric problem, Substance abuse, and Therapy.    Social History: The patient  reports that he has been smoking cigarettes. He has a 2.50 pack-year smoking history. He has never used smokeless tobacco. He reports current alcohol use of about 2.0 standard drinks of alcohol per week. He reports current drug use. Drugs: Heroin and Hydrocodone.    Family History: Their family history includes Colon cancer in his maternal grandfather; Diabetes Mellitus in his maternal  grandfather; Hypertension in his father, mother, and sister.    Allergies: Wellbutrin [bupropion hcl]     Meds:   Current Outpatient Medications on File Prior to Visit   Medication Sig Dispense Refill    amLODIPine (NORVASC) 5 MG tablet Take 1 tablet (5 mg total) by mouth once daily. 90 tablet 3    atomoxetine (STRATTERA) 25 MG capsule Take 1 capsule (25 mg total) by mouth once daily. (Patient not taking: Reported on 2/16/2022) 30 capsule 11    desonide (DESOWEN) 0.05 % cream Apply to affected area 2 times daily 60 g 1    hydroCHLOROthiazide (HYDRODIURIL) 25 MG tablet Take 1 tablet (25 mg total) by mouth once daily. 90 tablet 3    losartan (COZAAR) 100 MG tablet Take 1 tablet (100 mg total) by mouth once daily. 90 tablet 3    melatonin 10 mg Tab Take by mouth Daily.      traZODone (DESYREL) 50 MG tablet Take 1 tablet (50 mg total) by mouth every evening. (Patient not taking: Reported on 2/16/2022) 30 tablet 11    venlafaxine (EFFEXOR-XR) 75 MG 24 hr capsule Take 1 capsule (75 mg total) by mouth once daily. 30 capsule 11     No current facility-administered medications on file prior to visit.       Exam:  Vital Signs deferred with home visit    Constitutional  Well-developed, well-nourished, appears stated age   Eyes  No scleral icterus   ENT  Moist oral mucosa   Cardiovascular  No lower extremity edema    Respiratory  No labored breathing    Skin  No rashes   Hematologic  No bruising   Psychiatric  Normal mood and affect   Other  GI/ deferred    Neurological     * Mental status  Alert and oriented to person, place, time, and situation; no dysarthria; no aphasia; normal recent and remote memory; follows commands   * Cranial nerves     - CN II  Pupils midposition and symmetric   - CN III, IV, VI  Extraocular movements full, no nystagmus visualized   - CN V  Unable to test   - CN VII  Face strong and symmetric bilaterally    - CN VIII  Hearing grossly intact with conversation    - CN IX, X  Palate raises  midline and symmetric    - CN XI  Strong shoulder shrug B    - CN XII  Tongue appears midline    * Motor  Normal bulk by appearance, no drift, neg tinnel and phalen sign   * Sensory  Not tested objectively, no changes described by the patient   * Deep tendon reflexes  Not tested   * Coord/Movemt/Gait No hypophonic speech.  No facial masking.  No B bradykinesia.  No tremor with rest, posture, kinesis, or intention.   No chorea, athetosis, dystonia, myoclonus, or tics.          Medical Record Review:  - Lab Results:  No visits with results within 3 Month(s) from this visit.   Latest known visit with results is:   Lab Visit on 11/29/2021   Component Date Value Ref Range Status    WBC 11/29/2021 6.62  3.90 - 12.70 K/uL Final    RBC 11/29/2021 5.26  4.60 - 6.20 M/uL Final    Hemoglobin 11/29/2021 16.5  14.0 - 18.0 g/dL Final    Hematocrit 11/29/2021 45.4  40.0 - 54.0 % Final    MCV 11/29/2021 86  82 - 98 fL Final    MCH 11/29/2021 31.4 (A) 27.0 - 31.0 pg Final    MCHC 11/29/2021 36.3 (A) 32.0 - 36.0 g/dL Final    RDW 11/29/2021 12.0  11.5 - 14.5 % Final    Platelets 11/29/2021 274  150 - 450 K/uL Final    MPV 11/29/2021 10.3  9.2 - 12.9 fL Final    Immature Granulocytes 11/29/2021 0.3  0.0 - 0.5 % Final    Gran # (ANC) 11/29/2021 3.7  1.8 - 7.7 K/uL Final    Immature Grans (Abs) 11/29/2021 0.02  0.00 - 0.04 K/uL Final    Lymph # 11/29/2021 1.6  1.0 - 4.8 K/uL Final    Mono # 11/29/2021 0.8  0.3 - 1.0 K/uL Final    Eos # 11/29/2021 0.4  0.0 - 0.5 K/uL Final    Baso # 11/29/2021 0.07  0.00 - 0.20 K/uL Final    nRBC 11/29/2021 0  0 /100 WBC Final    Gran % 11/29/2021 56.2  38.0 - 73.0 % Final    Lymph % 11/29/2021 24.3  18.0 - 48.0 % Final    Mono % 11/29/2021 12.1  4.0 - 15.0 % Final    Eosinophil % 11/29/2021 6.0  0.0 - 8.0 % Final    Basophil % 11/29/2021 1.1  0.0 - 1.9 % Final    Differential Method 11/29/2021 Automated   Final    Sodium 11/29/2021 139  136 - 145 mmol/L Final    Potassium  2021 3.4 (A) 3.5 - 5.1 mmol/L Final    Chloride 2021 100  95 - 110 mmol/L Final    CO2 2021 29  23 - 29 mmol/L Final    Glucose 2021 99  70 - 110 mg/dL Final    BUN 2021 17  6 - 20 mg/dL Final    Creatinine 2021 1.0  0.5 - 1.4 mg/dL Final    Calcium 2021 9.6  8.7 - 10.5 mg/dL Final    Total Protein 2021 8.1  6.0 - 8.4 g/dL Final    Albumin 2021 4.3  3.5 - 5.2 g/dL Final    Total Bilirubin 2021 1.1 (A) 0.1 - 1.0 mg/dL Final    Alkaline Phosphatase 2021 59  55 - 135 U/L Final    AST 2021 23  10 - 40 U/L Final    ALT 2021 24  10 - 44 U/L Final    Anion Gap 2021 10  8 - 16 mmol/L Final    eGFR if African American 2021 >60  >60 mL/min/1.73 m^2 Final    eGFR if non African American 2021 >60  >60 mL/min/1.73 m^2 Final    TSH 2021 1.813  0.400 - 4.000 uIU/mL Final    Cholesterol 2021 210 (A) 120 - 199 mg/dL Final    Triglycerides 2021 167 (A) 30 - 150 mg/dL Final    HDL 2021 44  40 - 75 mg/dL Final    LDL Cholesterol 2021 132.6  63.0 - 159.0 mg/dL Final    HDL/Cholesterol Ratio 2021 21.0  20.0 - 50.0 % Final    Total Cholesterol/HDL Ratio 2021 4.8  2.0 - 5.0 Final    Non-HDL Cholesterol 2021 166  mg/dL Final       Diagnoses:   1) Cervical radiculopathy    2) LUE weakness  3) Neck pain  4) muscle spasm in neck    Medical Decision Makin) Naproxen 500 mg bid prn for 7 days (caution with BP)- explained to patient  2) Norlfex 100 mg bid prn for neck pain  3) PT for neck  4) MRI C spine- eval for radiculopathy given LUE arm weakness when he lifts hands over his head    F/u in Neurology clinic in person in 6 weeks for MRI results and treatment response      I spent 34 minutes with the patient with >50% of the time spent with counseling and education regarding:  - results of data, diagnosis, and recommendations stated above

## 2022-05-18 ENCOUNTER — TELEPHONE (OUTPATIENT)
Dept: NEUROLOGY | Facility: CLINIC | Age: 37
End: 2022-05-18
Payer: COMMERCIAL

## 2022-05-18 ENCOUNTER — PATIENT MESSAGE (OUTPATIENT)
Dept: NEUROLOGY | Facility: CLINIC | Age: 37
End: 2022-05-18
Payer: COMMERCIAL

## 2022-05-18 NOTE — TELEPHONE ENCOUNTER
----- Message from Jennyfer Cortez MD sent at 5/13/2022  3:09 PM CDT -----  MRI  Physical [facility]  Meds    F/u in 6-8 weeks in clinic

## 2022-05-19 ENCOUNTER — OFFICE VISIT (OUTPATIENT)
Dept: PSYCHIATRY | Facility: CLINIC | Age: 37
End: 2022-05-19
Payer: MEDICAID

## 2022-05-19 DIAGNOSIS — F41.9 ANXIETY DISORDER, UNSPECIFIED TYPE: Primary | ICD-10-CM

## 2022-05-19 DIAGNOSIS — F11.11 HISTORY OF OPIOID ABUSE: ICD-10-CM

## 2022-05-19 DIAGNOSIS — F90.9 ATTENTION DEFICIT HYPERACTIVITY DISORDER (ADHD), UNSPECIFIED ADHD TYPE: ICD-10-CM

## 2022-05-19 PROCEDURE — 1160F PR REVIEW ALL MEDS BY PRESCRIBER/CLIN PHARMACIST DOCUMENTED: ICD-10-PCS | Mod: CPTII,95,, | Performed by: NURSE PRACTITIONER

## 2022-05-19 PROCEDURE — 4010F PR ACE/ARB THEARPY RXD/TAKEN: ICD-10-PCS | Mod: CPTII,95,, | Performed by: NURSE PRACTITIONER

## 2022-05-19 PROCEDURE — 99213 OFFICE O/P EST LOW 20 MIN: CPT | Mod: 95,,, | Performed by: NURSE PRACTITIONER

## 2022-05-19 PROCEDURE — 1159F MED LIST DOCD IN RCRD: CPT | Mod: CPTII,95,, | Performed by: NURSE PRACTITIONER

## 2022-05-19 PROCEDURE — 1160F RVW MEDS BY RX/DR IN RCRD: CPT | Mod: CPTII,95,, | Performed by: NURSE PRACTITIONER

## 2022-05-19 PROCEDURE — 99213 PR OFFICE/OUTPT VISIT, EST, LEVL III, 20-29 MIN: ICD-10-PCS | Mod: 95,,, | Performed by: NURSE PRACTITIONER

## 2022-05-19 PROCEDURE — 4010F ACE/ARB THERAPY RXD/TAKEN: CPT | Mod: CPTII,95,, | Performed by: NURSE PRACTITIONER

## 2022-05-19 PROCEDURE — 1159F PR MEDICATION LIST DOCUMENTED IN MEDICAL RECORD: ICD-10-PCS | Mod: CPTII,95,, | Performed by: NURSE PRACTITIONER

## 2022-05-19 RX ORDER — VENLAFAXINE HYDROCHLORIDE 150 MG/1
150 CAPSULE, EXTENDED RELEASE ORAL DAILY
Qty: 90 CAPSULE | Refills: 3 | Status: SHIPPED | OUTPATIENT
Start: 2022-05-19

## 2022-05-19 NOTE — PROGRESS NOTES
"Outpatient Psychiatry Follow-Up Visit (MD/NP)    5/19/2022    Clinical Status of Patient:  Outpatient (Ambulatory)    Chief Complaint:  Everett Bateman is a 36 y.o. male who presents today for follow-up of anxiety.  Met with patient.      Last visit was:   The patient location is: home  The chief complaint leading to consultation is: anxiety    Visit type: audiovisual    Face to Face time with patient: 28 minutes  30 minutes of total time spent on the encounter, which includes face to face time and non-face to face time preparing to see the patient (eg, review of tests), Obtaining and/or reviewing separately obtained history, Documenting clinical information in the electronic or other health record, Independently interpreting results (not separately reported) and communicating results to the patient/family/caregiver, or Care coordination (not separately reported).     Each patient to whom he or she provides medical services by telemedicine is:  (1) informed of the relationship between the physician and patient and the respective role of any other health care provider with respect to management of the patient; and (2) notified that he or she may decline to receive medical services by telemedicine and may withdraw from such care at any time.    Interval History and Content of Current Session:  Current Psychiatric Medications/changes  · Continue Effexor XR 75 mg po daily  · Continue Naltrexone 50 mg po daily  · Continue Trazodone 50 mg po daily  · Start Strattera 25 mg po daily - informed on potential side effects including exacerbation of HTN    Virtual Visit: Pt stated, "I've been feeling anxious. Things are going well; go a raise at work.  I'm in a custody santacruz at work.  I get a CPAP so my sleep improved and no longer taking Trazodone". Didn't like Strattera due to feeing overstimulated.  Denies SI/HI/AVH. Will increase Effexor for anxiety.     Psychotherapy:  · Target symptoms: anxiety   · Why chosen therapy is " appropriate versus another modality: relevant to diagnosis  · Outcome monitoring methods: self-report  · Therapeutic intervention type: insight oriented psychotherapy  · Topics discussed/themes: building skills sets for symptom management, symptom recognition  · The patient's response to the intervention is accepting. The patient's progress toward treatment goals is good.   · Duration of intervention: 14 minutes.    Review of Systems   · PSYCHIATRIC: Pertinant items are noted in the narrative.  · CONSTITUTIONAL: No weight gain or loss.   · MUSCULOSKELETAL: No pain or stiffness of the joints.  · NEUROLOGIC: No weakness, sensory changes, seizures, confusion, memory loss, tremor or other abnormal movements.  · ENDOCRINE: No polydipsia or polyuria.  · INTEGUMENTARY: No rashes or lacerations.  · EYES: No exophthalmos, jaundice or blindness.  · ENT: No dizziness, tinnitus or hearing loss.  · RESPIRATORY: No shortness of breath.  · CARDIOVASCULAR: No tachycardia or chest pain.  · GASTROINTESTINAL: No nausea, vomiting, pain, constipation or diarrhea.  · GENITOURINARY: No frequency, dysuria or sexual dysfunction.  · HEMATOLOGIC/LYMPHATIC: No excessive bleeding, prolonged or excessive bleeding after dental extraction/injury.  · ALLERGIC/IMMUNOLOGIC: No allergic response to materials, foods or animals at this time.    Past Medical, Family and Social History: The patient's past medical, family and social history have been reviewed and updated as appropriate within the electronic medical record - see encounter notes.    Compliance: yes    Side effects: see above    Risk Parameters:  Patient reports no suicidal ideation  Patient reports no homicidal ideation  Patient reports no self-injurious behavior  Patient reports no violent behavior    Exam (detailed: at least 9 elements; comprehensive: all 15 elements)   Constitutional  Vitals:  Most recent vital signs, dated less than 90 days prior to this appointment, were not reviewed.    There were no vitals filed for this visit.     General:  unremarkable, age appropriate     Musculoskeletal  Muscle Strength/Tone:  no tremor, no tic   Gait & Station:  non-ataxic     Psychiatric  Speech:  no latency; no press   Mood & Affect:  steady  congruent and appropriate   Thought Process:  normal and logical   Associations:  intact   Thought Content:  normal, no suicidality, no homicidality, delusions, or paranoia   Insight:  intact   Judgement: behavior is adequate to circumstances   Orientation:  grossly intact   Memory: intact for content of interview   Language: grossly intact   Attention Span & Concentration:  able to focus   Fund of Knowledge:  intact and appropriate to age and level of education     Assessment and Diagnosis   Status/Progress: Based on the examination today, the patient's problem(s) is/are adequately but not ideally controlled.  New problems have not been presented today.   Co-morbidities and Lack of compliance are not complicating management of the primary condition.  There are no active rule-out diagnoses for this patient at this time.     General Impression:       ICD-10-CM ICD-9-CM   1. Anxiety disorder, unspecified type  F41.9 300.00   2. Attention deficit hyperactivity disorder (ADHD), unspecified ADHD type  F90.9 314.01   3. History of opioid abuse  F11.11 305.53       Intervention/Counseling/Treatment Plan   · Medication Management: The risks and benefits of medication were discussed with the patient.   · AA/NA/CA/ACOA/Abstinence  · The treatment plan and follow up plan were reviewed with the patient.   · Reviewed available medical records and labs  · Increase to Effexor  mg po daily  · DC Strattera  · DC Trazodone    Return to Clinic: 3 months    Risks, benefits, side effects and alternative treatments discussed with patient. Patient agrees with the current plan as documented.  Encouraged Patient to keep future appointments.  Take medications as prescribed and abstain  from substance abuse.  Pt to present to ED for thoughts to harm himself or others

## 2022-05-22 PROBLEM — F90.9 ATTENTION DEFICIT HYPERACTIVITY DISORDER (ADHD): Status: ACTIVE | Noted: 2022-05-22

## 2022-05-22 PROBLEM — F41.9 ANXIETY DISORDER: Status: ACTIVE | Noted: 2022-05-22

## 2022-05-23 ENCOUNTER — CLINICAL SUPPORT (OUTPATIENT)
Dept: REHABILITATION | Facility: HOSPITAL | Age: 37
End: 2022-05-23
Payer: COMMERCIAL

## 2022-05-23 DIAGNOSIS — M54.2 NECK PAIN: ICD-10-CM

## 2022-05-23 DIAGNOSIS — R29.898 DECREASED RANGE OF MOTION OF NECK: ICD-10-CM

## 2022-05-23 DIAGNOSIS — M54.12 CERVICAL RADICULOPATHY: ICD-10-CM

## 2022-05-23 DIAGNOSIS — R20.0 NUMBNESS AND TINGLING IN LEFT HAND: ICD-10-CM

## 2022-05-23 DIAGNOSIS — R29.898 DECREASED GRIP STRENGTH OF LEFT HAND: ICD-10-CM

## 2022-05-23 DIAGNOSIS — R29.898 LEFT ARM WEAKNESS: ICD-10-CM

## 2022-05-23 DIAGNOSIS — R20.2 NUMBNESS AND TINGLING IN LEFT HAND: ICD-10-CM

## 2022-05-23 PROCEDURE — 97161 PT EVAL LOW COMPLEX 20 MIN: CPT | Mod: PN

## 2022-05-23 NOTE — PLAN OF CARE
[facility]  Physical Therapy Initial Evaluation    Name: Everett Bateman  Clinic Number: [id number]    Therapy Diagnosis:   Encounter Diagnoses   Name Primary?    Neck pain     Cervical radiculopathy     Numbness and tingling in left hand     Left arm weakness     Decreased range of motion of neck     Decreased  strength of left hand      Physician: Jennyfer Cortez MD    Physician Orders: PT Eval and Treat   Medical Diagnosis from Referral:      M54.2 (ICD-10-CM) - Cervicalgia   M54.12 (ICD-10-CM) - Radiculopathy, cervical region   R20.0 (ICD-10-CM) - Anesthesia of skin   R20.2 (ICD-10-CM) - Paresthesia of skin   R29.898 (ICD-10-CM) - Other symptoms and signs involving the musculoskeletal system       Evaluation Date: 5/23/2022  Plan of Care Expiration: 8/23/22    Authorization Period Expiration: 5/23/23  Visit # / Visits authorized: 1/ 1      Time In: 1045  Time Out: 1130    Total Billable Time: 45 minutes    Precautions: Standard    Subjective   Date of onset: 3 weeks    History of current condition:   Everett  is a 36 year old right handed presenting with c/o neck with pain with left upper extremity radiculopathy.  He reports a possible initial onset 10 years ago falling off of a ladder. He also had a linen rack fall on him at a hotel 6 years ago.  He states a recent onset of pain 3 weeks ago from working as a full time . He states he has an MRI scheduled for May 30th. He reports difficulty working over his head and c/o problems gripping and leaning on his arm.  His goal is to not feel pain and to increase his strength.      Medical History:   Past Medical History:   Diagnosis Date    Addiction to drug     Anxiety     Headache     Heart murmur     Hx of psychiatric care     Hypertension     Psychiatric problem     Substance abuse     Therapy        Surgical History:   Everett Bateman  has a past surgical history that includes Hand surgery.    Medications:    Everett has a current medication list which includes the following prescription(s): amlodipine, desonide, hydrochlorothiazide, losartan, melatonin, trazodone, and venlafaxine.    Allergies:   Review of patient's allergies indicates:   Allergen Reactions    Wellbutrin [bupropion hcl] Other (See Comments)     Felt suicidal        Imaging: none recently, MRI scheduled    Prior Therapy:  PT for neck 6 and 10 years ago.   Social History:  unknown  Occupation:   Prior Level of Function: independent  Current Level of Function: independent    Pain:  Current 6/10, worst 6/10, best 3/10   Location: cervical, left UE     Aggravating Factors: reaching overhead, leaning on arm,  Work, yardwork  Easing Factors: sitting, massage    Pts goals:  His goal is to not feel pain and to increase his strength.     Objective     Observation: pt stands with rounded shoulders, forward head posture  Palpation: mild tenderness left lower cervical TP's, left CT junction, left upper trap      Range of Motion/Strength:      Cervical AROM: Degrees   Flexion 40   Extension 40   Right side bending 30   Left side bending 30   Right rotation 60   Left rotation 50   Cervical quadrant reveals:     AROM: Bilateral UE: Grossly WFL  MMT:  Right UE: 5-   Left UE: 4+    Mobility: C/S p/a grade 2  : position 2:  R:110 lbs,  L70lbs      Bed Mobility:Independent  Transfers: Independent    Special Tests:   Spurlings: negative    CMS Impairment/Limitation/Restriction for FOTO Hand Survey  Status Limitation G-Code CMS Severity Modifier  Intake 69% 31% Current Status CJ - At least 20 percent but less than 40 percent  Predicted 75% 25% Goal Status+ CJ - At least 20 percent but less than 40 percent    TREATMENT     Treatment Time In: 1115  Treatment Time Out: 1130    Total Treatment time separate from Evaluation: 15 minutes    Everett received therapeutic exercises to develop strength, endurance, ROM, flexibility, posture and core stabilization for 10  minutes including:   upper trap stretch 20 sec x 4  Levator stretch 20 sec x 4  Scapular retraction 2 x 10  Corner stretch 20 sec x 4    Everett received the following manual therapy techniques:  were applied to the: cervical spine for 5 minutes, including:  -c/s stretching, c/s distraction      Education provided:   - HEP compliance    Written Home Exercises Provided: yes.    Exercises were reviewed and Everett was able to demonstrate them prior to the end of the session.  Everett demonstrated good  understanding of the education provided.     See EMR under Patient Instructions for exercises provided 5/23/2022.    Assessment     Pt presents with signs and symptoms consistent with referring diagnosis. Evaluation has determined a decrease in functional status and subjective and objective deficits that can be addressed by physical therapy intervention. Pt demonstrates pain limiting functional activities. Decreased flexibility and strength limiting normal movement patterns. Decreased segmental motion. Decreased postural strength and awareness. Positive special testing. Decreased participation in functional and recreational activities. Subjective and objective measures are addressed by goals in the plan of care.  Patient/family are involved in the development of these goals. Patient/family are educated about current injury and treatment.       Plan of care was dicussed with patient. Pt will benefit from skilled outpatient Physical Therapy to address the deficits stated above and in the chart below, provide pt/family education, and to maximize pt's level of independence. Pt's spiritual, cultural and educational needs considered and patient is agreeable to the plan of care and goals as stated below:     Pt prognosis is Good.  Anticipated Barriers for therapy: none    Medical Necessity is demonstrated by the following  History  Co-morbidities and personal factors that may impact the plan of care Co-morbidities:   Addiction to  drug   Anxiety   Headache   Heart murmur   Hx of psychiatric care   Hypertension   Psychiatric problem   Substance abuse   Therapy       Personal Factors:   no deficits     low   Examination  Body Structures and Functions, activity limitations and participation restrictions that may impact the plan of care Body Regions:   neck  upper extremities    Body Systems:    gross symmetry  ROM  strength    Participation Restrictions:   Work, yardwork    Activity limitations:   Learning and applying knowledge  no deficits    General Tasks and Commands  no deficits    Communication  no deficits    Mobility  lifting and carrying objects  fine hand use (grasping/picking up)  moving around using equipment (WC)    Self care  dressing    Domestic Life  doing house work (cleaning house, washing dishes, laundry)    Interactions/Relationships  no deficits    Life Areas  no deficits    Community and Social Life  community life  recreation and leisure         low   Clinical Presentation stable and uncomplicated low   Decision Making/ Complexity Score: low     Goals:    Short Term Goals (4 Weeks):     1.Pt to increase strength by a 1/2 grade of muscles test to allow for improvement in functional activities such as performing chores.  2.Pt to improve range of motion by 25% to allow for improved functional mobility to allow for improvement in IADLs.   3.Pt to report compliance with HEP and demonstrate proper exercise technique to PT to show competence with self management of condition.  4.Decrease pain by 25% during functional activities.    Long Term Goals (12 Weeks):     1. Increase ROM to allow improved joint biomechanics during functional activities.   2.Increase trunk and upper extremity strength to within normal limits during functional activities.   3. Independent with home exercise program.   4. Full return to functional activities with manageable complaints.  5. Patient to demonstrate improved posture and body mechanics.  6.  Decrease pain by 75% during functional activities.    Plan     Plan of care Certification: 5/23/2022 to 8/23/22.    Recommended Treatment Plan: 2 times per week for 12 weeks with treatments to consist of:  Neuromuscular and postural re-education,  training, therapeutic exercise, therapeutic activities,balance training, gait training, manual therapy, soft tissue mobilization, ROM exercises, Cardiovascular,  Postural stabilization, manual traction, spinal mobilization, moist heat, cryotherapy, electrical stimulation, ultrasound, home exercise education and planning.    Jerson Valera, PT

## 2022-05-31 ENCOUNTER — PATIENT MESSAGE (OUTPATIENT)
Dept: NEUROLOGY | Facility: CLINIC | Age: 37
End: 2022-05-31
Payer: COMMERCIAL

## 2022-06-01 ENCOUNTER — CLINICAL SUPPORT (OUTPATIENT)
Dept: REHABILITATION | Facility: HOSPITAL | Age: 37
End: 2022-06-01
Payer: COMMERCIAL

## 2022-06-01 DIAGNOSIS — R29.898 DECREASED RANGE OF MOTION OF NECK: Primary | ICD-10-CM

## 2022-06-01 DIAGNOSIS — R29.898 DECREASED GRIP STRENGTH OF LEFT HAND: ICD-10-CM

## 2022-06-01 PROCEDURE — 97140 MANUAL THERAPY 1/> REGIONS: CPT | Mod: PN

## 2022-06-01 PROCEDURE — 97110 THERAPEUTIC EXERCISES: CPT | Mod: PN

## 2022-06-01 NOTE — PROGRESS NOTES
Physical Therapy Daily Treatment Note     Name: Everett Bateman  Clinic Number: [id number]    Therapy Diagnosis:   Encounter Diagnoses   Name Primary?    Decreased range of motion of neck Yes    Decreased  strength of left hand      Physician: Jennyfer Cortez MD    Visit Date: 6/1/2022    Physician Orders: PT Eval and Treat   Medical Diagnosis from Referral:       M54.2 (ICD-10-CM) - Cervicalgia   M54.12 (ICD-10-CM) - Radiculopathy, cervical region   R20.0 (ICD-10-CM) - Anesthesia of skin   R20.2 (ICD-10-CM) - Paresthesia of skin   R29.898 (ICD-10-CM) - Other symptoms and signs involving the musculoskeletal system         Evaluation Date: 5/23/2022  Plan of Care Expiration: 8/23/22     Authorization Period Expiration: 5/23/23  Visit # / Visits authorized: 1/ 1         Time In: 1300  Time Out: 1345    Total Billable Time: 45 minutes    Precautions: Standard    Subjective     Pt reports: the neck is doing a little better.  He was compliant with home exercise program.  Response to previous treatment: good  Functional change: none    Pain: 5/10  Location: left cervical      Objective     Everett received therapeutic exercises to develop strength, endurance, ROM, flexibility, posture and core stabilization for 35 minutes including:     UBE x 4 min  upper trap stretch 20 sec x 4  Levator stretch 20 sec x 4  Scapular retraction 2 x 10  Corner stretch 20 sec x 4  Thoracic ext vs towel roll x 20  Pec stretch on 1/2 roll      Everett received the following manual therapy techniques:  were applied to the: cervical spine for 10 minutes, including:  -c/s stretching, c/s distraction       Education provided:   - HEP compliance    Written Home Exercises Provided: Patient instructed to cont prior HEP.  Exercises were reviewed and Everett was able to demonstrate them prior to the end of the session.  Everett demonstrated good  understanding of the education provided.     See EMR under Patient Instructions for exercises provided  6/1/2022.    Assessment     Pt requires moderate cueing to improve scapular positioning with postural therex. No c/o increased discomfort with prescribed activities.  Good response to exercise progression consisting of cervical and thoracic mobility and postural re-education. Everett is progressing well towards his goals.     Pt prognosis is Good.     Pt will continue to benefit from skilled outpatient physical therapy to address the deficits listed in the problem list box on initial evaluation, provide pt/family education and to maximize pt's level of independence in the home and community environment.     Pt's spiritual, cultural and educational needs considered and pt agreeable to plan of care and goals.     Anticipated barriers to physical therapy:  none    Short Term Goals (4 Weeks):     1.Pt to increase strength by a 1/2 grade of muscles test to allow for improvement in functional activities such as performing chores.  2.Pt to improve range of motion by 25% to allow for improved functional mobility to allow for improvement in IADLs.   3.Pt to report compliance with HEP and demonstrate proper exercise technique to PT to show competence with self management of condition.  4.Decrease pain by 25% during functional activities.    Long Term Goals (12 Weeks):     1. Increase ROM to allow improved joint biomechanics during functional activities.   2.Increase trunk and lower extremity strength to within normal limits during functional activities.   3. Independent with home exercise program.   4. Full return to functional activities with manageable complaints.  5. Patient to demonstrate improved posture and body mechanics.  6. Decrease pain by 75% during functional activities.         Plan       Recommended Treatment Plan: 2-3 times per week for 12 weeks with treatments to consist of:  Neuromuscular and postural re-education,  training, therapeutic exercise, therapeutic activities,balance training, gait  training, manual therapy, soft tissue mobilization, ROM exercises, Cardiovascular,  Postural stabilization, manual traction, spinal mobilization, moist heat, cryotherapy, electrical stimulation, ultrasound, home exercise education and planning.    Continue with established  PT Plan of Care towards patient goals.     Jerson Valera, PT

## 2022-06-01 NOTE — TELEPHONE ENCOUNTER
----- Message from Mikki Garcia sent at 6/1/2022 10:48 AM CDT -----  Needs advice from nurse:      Who Called:pt  Regarding:needs Naproxen and Orphenadrine citrate called in  Would the patient rather a call back or V[facility]?  Best Call Back number:[phone]  Additional Info:[facility]/pharmacy #[id number] - [place], [place] - [address] (Ph: [phone])

## 2022-06-01 NOTE — TELEPHONE ENCOUNTER
Informed pt he needed to come to clinic for appointment before medication can be refilled. Allowed pt time to ask questions. Pt verbalized understanding.

## 2022-07-26 NOTE — PROGRESS NOTES
The patient location is: [place]  The chief complaint leading to consultation is: NICOLAS    Visit type: audiovisual    15 minutes of total time spent on the encounter, which includes face to face time and non-face to face time preparing to see the patient (eg, review of tests), Obtaining and/or reviewing separately obtained history, Documenting clinical information in the electronic or other health record, Independently interpreting results (not separately reported) and communicating results to the patient/family/caregiver, or Care coordination (not separately reported).     Each patient to whom he or she provides medical services by telemedicine is:  (1) informed of the relationship between the physician and patient and the respective role of any other health care provider with respect to management of the patient; and (2) notified that he or she may decline to receive medical services by telemedicine and may withdraw from such care at any time.    ESTABLISHED PATIENT VISIT    Everett Bateman  is a pleasant 37 y.o. male  with PMH significant for HTN who presented early 2022 for evaluation of snoring, daytime sleepiness, witnessed apneas.    Here today for CPAP follow-up    PLAN last visit:   -will proceed with sleep testing   -discussed trial of PAP therapy if NICOLAS present   -driving precautions were discussed with the patient      Since last visit:   HST 3/14/22: AHI 36, RDI 45  CPAP titration 4/1/22: simplus medium, 9cwp good in supine N3 and REM  auto 9-14, FFM, needed 13cwp to eliminate breakthru events    PAP history   Problems    Mask FFM   Pressure Feels ok   Benefit    DME HME   Machine age Mid 2022   Download 7.25.22: 30/30 x 6h 18m, 5-12 (9/11/11.4), Leak 2/12/27, AHI 4.2 (o 2.0)       SLEEP SCHEDULE   Bed Time 8:30PM (M-th), [place]   Sleep Latency hour   Arousals Less often   Nocturia    Back to sleep 5-15 min   Wake time 2:45 AM ( 7-8AM)   Naps less   Work      There were no vitals filed for this visit.  Physical  Exam:    GEN:   Well-appearing  Psych:  Appropriate affect, demonstrates insight  SKIN:  No rash on the face or bridge of the nose    LABS:   No results found for: HGB, CO2    RECORDS REVIEWED PREVIOUSLY:    No prior sleep testing.    ASSESSMENT    No flowsheet data found.  PROBLEM DESCRIPTION/ Sx on Presentation Interval Hx STATUS   Severe NICOLAS   + snoring, + witnessed apneas  HEENT: MP1, + oropharynx shallow in A-P dimension Good usage and efficacy controlled   SLeepiness   + sleepiness when inactive (dozes off in the afternoon)  denies sleepiness when driving   ESS 12/24 on intake Less sleepy during the day improved   ADHD   On strattera in the past, no longer Improved concentration improved   Other issues: HTN    PLAN     -adjust 9-16 cwp, ramp 5 x 10, adjust min to 13cwp if tolerating  -the patient is using and benefiting from PAP therapy    RTC 1 year         The patient was given open opportunity to ask questions and/or express concerns about treatment plan.   All questions/concerns were discussed.     Two patient identifiers used prior to evaluation.

## 2022-07-27 ENCOUNTER — OFFICE VISIT (OUTPATIENT)
Dept: SLEEP MEDICINE | Facility: CLINIC | Age: 37
End: 2022-07-27
Payer: COMMERCIAL

## 2022-07-27 DIAGNOSIS — G47.33 OSA (OBSTRUCTIVE SLEEP APNEA): Primary | ICD-10-CM

## 2022-07-27 DIAGNOSIS — G47.10 HYPERSOMNOLENCE: ICD-10-CM

## 2022-07-27 PROCEDURE — 4010F PR ACE/ARB THEARPY RXD/TAKEN: ICD-10-PCS | Mod: CPTII,95,, | Performed by: INTERNAL MEDICINE

## 2022-07-27 PROCEDURE — 99214 PR OFFICE/OUTPT VISIT, EST, LEVL IV, 30-39 MIN: ICD-10-PCS | Mod: 95,,, | Performed by: INTERNAL MEDICINE

## 2022-07-27 PROCEDURE — 4010F ACE/ARB THERAPY RXD/TAKEN: CPT | Mod: CPTII,95,, | Performed by: INTERNAL MEDICINE

## 2022-07-27 PROCEDURE — 99214 OFFICE O/P EST MOD 30 MIN: CPT | Mod: 95,,, | Performed by: INTERNAL MEDICINE

## 2023-03-22 ENCOUNTER — PATIENT MESSAGE (OUTPATIENT)
Dept: SLEEP MEDICINE | Facility: CLINIC | Age: 38
End: 2023-03-22
Payer: COMMERCIAL

## 2024-12-05 ENCOUNTER — OFFICE VISIT (OUTPATIENT)
Dept: SLEEP MEDICINE | Facility: CLINIC | Age: 39
End: 2024-12-05
Payer: COMMERCIAL

## 2024-12-05 VITALS
HEIGHT: 75 IN | BODY MASS INDEX: 28.65 KG/M2 | SYSTOLIC BLOOD PRESSURE: 110 MMHG | DIASTOLIC BLOOD PRESSURE: 78 MMHG | WEIGHT: 230.38 LBS | HEART RATE: 82 BPM

## 2024-12-05 DIAGNOSIS — G47.21 DELAYED SLEEP PHASE SYNDROME: Primary | ICD-10-CM

## 2024-12-05 DIAGNOSIS — R40.0 SOMNOLENCE: ICD-10-CM

## 2024-12-05 DIAGNOSIS — G47.33 OSA (OBSTRUCTIVE SLEEP APNEA): ICD-10-CM

## 2024-12-05 PROCEDURE — 1159F MED LIST DOCD IN RCRD: CPT | Mod: CPTII,S$GLB,, | Performed by: INTERNAL MEDICINE

## 2024-12-05 PROCEDURE — 3074F SYST BP LT 130 MM HG: CPT | Mod: CPTII,S$GLB,, | Performed by: INTERNAL MEDICINE

## 2024-12-05 PROCEDURE — 99999 PR PBB SHADOW E&M-EST. PATIENT-LVL III: CPT | Mod: PBBFAC,,, | Performed by: INTERNAL MEDICINE

## 2024-12-05 PROCEDURE — 3044F HG A1C LEVEL LT 7.0%: CPT | Mod: CPTII,S$GLB,, | Performed by: INTERNAL MEDICINE

## 2024-12-05 PROCEDURE — 3078F DIAST BP <80 MM HG: CPT | Mod: CPTII,S$GLB,, | Performed by: INTERNAL MEDICINE

## 2024-12-05 PROCEDURE — 99214 OFFICE O/P EST MOD 30 MIN: CPT | Mod: S$GLB,,, | Performed by: INTERNAL MEDICINE

## 2024-12-05 PROCEDURE — 3008F BODY MASS INDEX DOCD: CPT | Mod: CPTII,S$GLB,, | Performed by: INTERNAL MEDICINE

## 2024-12-05 NOTE — PROGRESS NOTES
"    ESTABLISHED PATIENT VISIT    Everett Bateman  is a pleasant 39 y.o. male  established with the [facility] sleep medicine clinic    Here today for CPAP follow-up    Since last visit:   See interval history in table below      Vitals:    12/05/24 1418   BP: 110/78   BP Location: Left arm   Patient Position: Sitting   Pulse: 82   Weight: 104.5 kg (230 lb 6.1 oz)   Height: 6' 3" (1.905 m)      Physical Exam:    GEN:   Well-appearing  Psych:  Appropriate affect, demonstrates insight  SKIN:  No rash on the face or bridge of the nose    LABS:   No results found for: "HGB", "CO2"    RECORDS REVIEWED PREVIOUSLY:    HST 3/14/22: AHI 36, RDI 45    CPAP titration 4/1/22: simplus medium, 9cwp good in supine N3 and REM  auto 9-14, FFM, needed 13cwp to eliminate breakthru events    7.25.22: 30/30 x 6h 18m, 5-12 (9/11/11.4), Leak 2/12/27, AHI 4.2 (o 2.0)      ASSESSMENT    Sig PMH HTN who presented early 2022 for evaluation of snoring, daytime sleepiness, witnessed apneas.:  PROBLEM DESCRIPTION/ Sx on Presentation Interval Hx STATUS PLAN   Severe NICOLAS   + snoring, + witnessed apneas  HEENT: MP1, + oropharynx shallow in A-P dimension      PAP history   Dx Study    Mask FFM   DME HME   My Air    CPAP age 2022   PAP altn    Benefits Sleeps better   PROBS           LOV: 7.27.22 Horacio    10.3.23: 20/30 x [id number], 9-16 (9.5/11.2/12.1), leak 5/30/40, AHI 1.8    10.4.23: no show for VV  12.5.24: scheduled    12.3.24: 12.1.24: 57/90 x 7h 17min, 9-16 (9.4/11.1/12.1), leak 9/32/42, AHI 4.3 (c 3.3)    12.5.24 scheduled    Events are higher on his back                PAP PLAN   E min 9 cwp ()   I max 16 cwp ()   PS/epr    RAMP    Other    Altn.        -consider CPAP max 2.0 pillow for side sleeping    -try to put CPAP on when getting into bed    The patient is using and benefitting from PAP therapy      CHECKOUT ITEMS:      Rtc 1 yr       SLeepiness   + sleepiness when inactive (dozes off in the afternoon)  denies sleepiness when driving   ESS " 12/24 on intake       Less sleepy during the day overall    improved   -NICOLAS management as above     SWSD       SLEEP SCHEDULE   Duration    Wind- down    Envmnt    CBTi    Meds prior    Meds now Amitryptiline 10mg (some hangover)   Bed Time 9:45 PM,  11PM   Lights out    Latency hour   Arousals Pets (cats)    Back to sleep 5-15 min   Stim. ctrl    Wake time 3:45AM (8-9AM)   Caffeine    Naps    Nocturia    Work   5AM -[place]      Amitryptiline helps (some hangover)    Trouble falling asleep     Trouble staying asleep            We discussed circadian rhythms    Consistent wake time   Other issues: HTN